# Patient Record
Sex: FEMALE | Race: WHITE | NOT HISPANIC OR LATINO | URBAN - METROPOLITAN AREA
[De-identification: names, ages, dates, MRNs, and addresses within clinical notes are randomized per-mention and may not be internally consistent; named-entity substitution may affect disease eponyms.]

---

## 2017-07-12 ENCOUNTER — OFFICE (AMBULATORY)
Dept: URBAN - METROPOLITAN AREA CLINIC 64 | Facility: CLINIC | Age: 47
End: 2017-07-12

## 2017-07-12 ENCOUNTER — HOSPITAL ENCOUNTER (OUTPATIENT)
Dept: OTHER | Facility: HOSPITAL | Age: 47
Setting detail: SPECIMEN
Discharge: HOME | End: 2017-07-12
Attending: INTERNAL MEDICINE | Admitting: INTERNAL MEDICINE

## 2017-07-12 ENCOUNTER — ON CAMPUS - OUTPATIENT (AMBULATORY)
Dept: URBAN - METROPOLITAN AREA HOSPITAL 2 | Facility: HOSPITAL | Age: 47
End: 2017-07-12

## 2017-07-12 VITALS
DIASTOLIC BLOOD PRESSURE: 63 MMHG | OXYGEN SATURATION: 99 % | HEART RATE: 114 BPM | HEART RATE: 79 BPM | SYSTOLIC BLOOD PRESSURE: 135 MMHG | SYSTOLIC BLOOD PRESSURE: 103 MMHG | RESPIRATION RATE: 15 BRPM | HEART RATE: 85 BPM | SYSTOLIC BLOOD PRESSURE: 114 MMHG | DIASTOLIC BLOOD PRESSURE: 93 MMHG | HEART RATE: 82 BPM | DIASTOLIC BLOOD PRESSURE: 65 MMHG | SYSTOLIC BLOOD PRESSURE: 146 MMHG | DIASTOLIC BLOOD PRESSURE: 81 MMHG | SYSTOLIC BLOOD PRESSURE: 116 MMHG | HEIGHT: 65 IN | DIASTOLIC BLOOD PRESSURE: 75 MMHG | RESPIRATION RATE: 20 BRPM | RESPIRATION RATE: 17 BRPM | OXYGEN SATURATION: 92 % | DIASTOLIC BLOOD PRESSURE: 83 MMHG | WEIGHT: 182 LBS | HEART RATE: 78 BPM | DIASTOLIC BLOOD PRESSURE: 69 MMHG | OXYGEN SATURATION: 94 % | RESPIRATION RATE: 18 BRPM | DIASTOLIC BLOOD PRESSURE: 71 MMHG | RESPIRATION RATE: 16 BRPM | OXYGEN SATURATION: 93 % | HEART RATE: 94 BPM | TEMPERATURE: 98.1 F | HEART RATE: 72 BPM | HEART RATE: 92 BPM | OXYGEN SATURATION: 97 % | OXYGEN SATURATION: 91 % | SYSTOLIC BLOOD PRESSURE: 117 MMHG | OXYGEN SATURATION: 89 % | SYSTOLIC BLOOD PRESSURE: 131 MMHG | SYSTOLIC BLOOD PRESSURE: 106 MMHG | SYSTOLIC BLOOD PRESSURE: 121 MMHG | OXYGEN SATURATION: 96 % | DIASTOLIC BLOOD PRESSURE: 48 MMHG

## 2017-07-12 DIAGNOSIS — D12.2 BENIGN NEOPLASM OF ASCENDING COLON: ICD-10-CM

## 2017-07-12 DIAGNOSIS — K29.80 DUODENITIS WITHOUT BLEEDING: ICD-10-CM

## 2017-07-12 DIAGNOSIS — D12.3 BENIGN NEOPLASM OF TRANSVERSE COLON: ICD-10-CM

## 2017-07-12 DIAGNOSIS — Z86.010 PERSONAL HISTORY OF COLONIC POLYPS: ICD-10-CM

## 2017-07-12 DIAGNOSIS — K64.1 SECOND DEGREE HEMORRHOIDS: ICD-10-CM

## 2017-07-12 DIAGNOSIS — K57.30 DIVERTICULOSIS OF LARGE INTESTINE WITHOUT PERFORATION OR ABS: ICD-10-CM

## 2017-07-12 DIAGNOSIS — K21.9 GASTRO-ESOPHAGEAL REFLUX DISEASE WITHOUT ESOPHAGITIS: ICD-10-CM

## 2017-07-12 LAB
GI HISTOLOGY: A. UNSPECIFIED: (no result)
GI HISTOLOGY: B. UNSPECIFIED: (no result)
GI HISTOLOGY: PDF REPORT: (no result)

## 2017-07-12 PROCEDURE — 45385 COLONOSCOPY W/LESION REMOVAL: CPT | Mod: PT | Performed by: INTERNAL MEDICINE

## 2017-07-12 PROCEDURE — 88305 TISSUE EXAM BY PATHOLOGIST: CPT | Mod: 26 | Performed by: INTERNAL MEDICINE

## 2017-07-12 PROCEDURE — 43235 EGD DIAGNOSTIC BRUSH WASH: CPT | Performed by: INTERNAL MEDICINE

## 2017-07-12 RX ORDER — LINACLOTIDE 290 UG/1
CAPSULE, GELATIN COATED ORAL
Qty: 90 | Refills: 0 | Status: COMPLETED
End: 2019-06-06

## 2017-07-12 RX ADMIN — PROPOFOL: 10 INJECTION, EMULSION INTRAVENOUS at 08:36

## 2018-01-03 ENCOUNTER — OFFICE (AMBULATORY)
Dept: URBAN - METROPOLITAN AREA CLINIC 64 | Facility: CLINIC | Age: 48
End: 2018-01-03

## 2018-01-03 VITALS
HEIGHT: 65 IN | DIASTOLIC BLOOD PRESSURE: 87 MMHG | SYSTOLIC BLOOD PRESSURE: 137 MMHG | WEIGHT: 185 LBS | HEART RATE: 63 BPM

## 2018-01-03 DIAGNOSIS — R11.0 NAUSEA: ICD-10-CM

## 2018-01-03 DIAGNOSIS — K62.5 HEMORRHAGE OF ANUS AND RECTUM: ICD-10-CM

## 2018-01-03 DIAGNOSIS — R10.84 GENERALIZED ABDOMINAL PAIN: ICD-10-CM

## 2018-01-03 DIAGNOSIS — K64.4 RESIDUAL HEMORRHOIDAL SKIN TAGS: ICD-10-CM

## 2018-01-03 LAB
CBC WITH DIFFERENTIAL/PLATELET: BASO (ABSOLUTE): 0 X10E3/UL (ref 0–0.2)
CBC WITH DIFFERENTIAL/PLATELET: BASOS: 0 %
CBC WITH DIFFERENTIAL/PLATELET: EOS (ABSOLUTE): 0.2 X10E3/UL (ref 0–0.4)
CBC WITH DIFFERENTIAL/PLATELET: EOS: 2 %
CBC WITH DIFFERENTIAL/PLATELET: HEMATOCRIT: 41.3 % (ref 34–46.6)
CBC WITH DIFFERENTIAL/PLATELET: HEMATOLOGY COMMENTS: (no result)
CBC WITH DIFFERENTIAL/PLATELET: HEMOGLOBIN: 14.2 G/DL (ref 11.1–15.9)
CBC WITH DIFFERENTIAL/PLATELET: IMMATURE CELLS: (no result)
CBC WITH DIFFERENTIAL/PLATELET: IMMATURE GRANS (ABS): 0 X10E3/UL (ref 0–0.1)
CBC WITH DIFFERENTIAL/PLATELET: IMMATURE GRANULOCYTES: 0 %
CBC WITH DIFFERENTIAL/PLATELET: LYMPHS (ABSOLUTE): 2.6 X10E3/UL (ref 0.7–3.1)
CBC WITH DIFFERENTIAL/PLATELET: LYMPHS: 29 %
CBC WITH DIFFERENTIAL/PLATELET: MCH: 31.8 PG (ref 26.6–33)
CBC WITH DIFFERENTIAL/PLATELET: MCHC: 34.4 G/DL (ref 31.5–35.7)
CBC WITH DIFFERENTIAL/PLATELET: MCV: 93 FL (ref 79–97)
CBC WITH DIFFERENTIAL/PLATELET: MONOCYTES(ABSOLUTE): 0.6 X10E3/UL (ref 0.1–0.9)
CBC WITH DIFFERENTIAL/PLATELET: MONOCYTES: 7 %
CBC WITH DIFFERENTIAL/PLATELET: NEUTROPHILS (ABSOLUTE): 5.3 X10E3/UL (ref 1.4–7)
CBC WITH DIFFERENTIAL/PLATELET: NEUTROPHILS: 62 %
CBC WITH DIFFERENTIAL/PLATELET: NRBC: (no result)
CBC WITH DIFFERENTIAL/PLATELET: PLATELETS: 235 X10E3/UL (ref 150–379)
CBC WITH DIFFERENTIAL/PLATELET: RBC: 4.46 X10E6/UL (ref 3.77–5.28)
CBC WITH DIFFERENTIAL/PLATELET: RDW: 13.5 % (ref 12.3–15.4)
CBC WITH DIFFERENTIAL/PLATELET: WBC: 8.7 X10E3/UL (ref 3.4–10.8)

## 2018-01-03 PROCEDURE — 99213 OFFICE O/P EST LOW 20 MIN: CPT | Performed by: NURSE PRACTITIONER

## 2018-01-03 RX ORDER — IBUPROFEN 800 MG/1
4800 TABLET ORAL
Refills: 0 | Status: COMPLETED
End: 2018-01-03

## 2018-01-03 RX ORDER — NAPROXEN SODIUM 500 MG/1
TABLET, FILM COATED, EXTENDED RELEASE ORAL
Qty: 0 | Refills: 0 | Status: COMPLETED
End: 2018-01-03

## 2018-01-03 RX ORDER — SACCHAROMYCES BOULARDII 50 MG
500 CAPSULE ORAL
Qty: 60 | Refills: 11 | Status: COMPLETED
Start: 2018-01-03 | End: 2018-07-13

## 2018-07-13 ENCOUNTER — OFFICE (AMBULATORY)
Dept: URBAN - METROPOLITAN AREA CLINIC 64 | Facility: CLINIC | Age: 48
End: 2018-07-13

## 2018-07-13 VITALS
HEIGHT: 65 IN | HEART RATE: 73 BPM | SYSTOLIC BLOOD PRESSURE: 128 MMHG | WEIGHT: 182 LBS | DIASTOLIC BLOOD PRESSURE: 98 MMHG

## 2018-07-13 DIAGNOSIS — K58.9 IRRITABLE BOWEL SYNDROME WITHOUT DIARRHEA: ICD-10-CM

## 2018-07-13 DIAGNOSIS — K64.1 SECOND DEGREE HEMORRHOIDS: ICD-10-CM

## 2018-07-13 PROCEDURE — 99214 OFFICE O/P EST MOD 30 MIN: CPT | Performed by: INTERNAL MEDICINE

## 2018-07-13 RX ORDER — LACTOBACIL 2/BIFIDO 1/S.THERMO 450B CELL
225 PACKET (EA) ORAL
Qty: 60 | Refills: 3 | Status: COMPLETED
Start: 2018-07-13 | End: 2019-06-06

## 2018-07-13 RX ORDER — DICYCLOMINE HYDROCHLORIDE 20 MG/1
60 TABLET ORAL
Qty: 120 | Refills: 12 | Status: COMPLETED
Start: 2018-07-13 | End: 2019-06-06 | Stop reason: SDUPTHER

## 2019-06-06 ENCOUNTER — ON CAMPUS - OUTPATIENT (AMBULATORY)
Dept: URBAN - METROPOLITAN AREA HOSPITAL 2 | Facility: HOSPITAL | Age: 49
End: 2019-06-06

## 2019-06-06 ENCOUNTER — OFFICE (AMBULATORY)
Dept: URBAN - METROPOLITAN AREA PATHOLOGY 4 | Facility: PATHOLOGY | Age: 49
End: 2019-06-06

## 2019-06-06 ENCOUNTER — HOSPITAL ENCOUNTER (OUTPATIENT)
Dept: OTHER | Facility: HOSPITAL | Age: 49
Setting detail: SPECIMEN
Discharge: HOME | End: 2019-06-06
Attending: INTERNAL MEDICINE | Admitting: INTERNAL MEDICINE

## 2019-06-06 VITALS
HEART RATE: 97 BPM | SYSTOLIC BLOOD PRESSURE: 161 MMHG | OXYGEN SATURATION: 95 % | HEIGHT: 65 IN | SYSTOLIC BLOOD PRESSURE: 125 MMHG | RESPIRATION RATE: 18 BRPM | DIASTOLIC BLOOD PRESSURE: 87 MMHG | SYSTOLIC BLOOD PRESSURE: 117 MMHG | OXYGEN SATURATION: 93 % | OXYGEN SATURATION: 97 % | HEART RATE: 95 BPM | OXYGEN SATURATION: 94 % | DIASTOLIC BLOOD PRESSURE: 75 MMHG | OXYGEN SATURATION: 96 % | HEART RATE: 96 BPM | SYSTOLIC BLOOD PRESSURE: 133 MMHG | SYSTOLIC BLOOD PRESSURE: 128 MMHG | DIASTOLIC BLOOD PRESSURE: 86 MMHG | HEART RATE: 107 BPM | SYSTOLIC BLOOD PRESSURE: 123 MMHG | SYSTOLIC BLOOD PRESSURE: 129 MMHG | HEART RATE: 87 BPM | RESPIRATION RATE: 16 BRPM | TEMPERATURE: 97.8 F | RESPIRATION RATE: 19 BRPM | OXYGEN SATURATION: 90 % | DIASTOLIC BLOOD PRESSURE: 70 MMHG | DIASTOLIC BLOOD PRESSURE: 96 MMHG | DIASTOLIC BLOOD PRESSURE: 72 MMHG | DIASTOLIC BLOOD PRESSURE: 78 MMHG | HEART RATE: 94 BPM | RESPIRATION RATE: 15 BRPM | SYSTOLIC BLOOD PRESSURE: 146 MMHG | HEART RATE: 90 BPM | WEIGHT: 181 LBS

## 2019-06-06 DIAGNOSIS — R19.4 CHANGE IN BOWEL HABIT: ICD-10-CM

## 2019-06-06 DIAGNOSIS — K62.5 HEMORRHAGE OF ANUS AND RECTUM: ICD-10-CM

## 2019-06-06 DIAGNOSIS — Z86.010 PERSONAL HISTORY OF COLONIC POLYPS: ICD-10-CM

## 2019-06-06 DIAGNOSIS — K57.30 DIVERTICULOSIS OF LARGE INTESTINE WITHOUT PERFORATION OR ABS: ICD-10-CM

## 2019-06-06 DIAGNOSIS — K64.1 SECOND DEGREE HEMORRHOIDS: ICD-10-CM

## 2019-06-06 LAB
GI HISTOLOGY: A. UNSPECIFIED: (no result)
GI HISTOLOGY: PDF REPORT: (no result)

## 2019-06-06 PROCEDURE — 88305 TISSUE EXAM BY PATHOLOGIST: CPT | Mod: 26 | Performed by: INTERNAL MEDICINE

## 2019-06-06 PROCEDURE — 45380 COLONOSCOPY AND BIOPSY: CPT | Performed by: INTERNAL MEDICINE

## 2019-06-06 RX ORDER — LINACLOTIDE 290 UG/1
CAPSULE, GELATIN COATED ORAL
Qty: 90 | Refills: 0 | Status: COMPLETED
End: 2019-06-06

## 2019-07-30 ENCOUNTER — OFFICE (AMBULATORY)
Dept: URBAN - METROPOLITAN AREA CLINIC 64 | Facility: CLINIC | Age: 49
End: 2019-07-30

## 2019-07-30 VITALS
DIASTOLIC BLOOD PRESSURE: 80 MMHG | WEIGHT: 185 LBS | HEIGHT: 65 IN | HEART RATE: 82 BPM | SYSTOLIC BLOOD PRESSURE: 117 MMHG

## 2019-07-30 DIAGNOSIS — R19.7 DIARRHEA, UNSPECIFIED: ICD-10-CM

## 2019-07-30 DIAGNOSIS — R11.0 NAUSEA: ICD-10-CM

## 2019-07-30 DIAGNOSIS — K58.9 IRRITABLE BOWEL SYNDROME WITHOUT DIARRHEA: ICD-10-CM

## 2019-07-30 PROCEDURE — 99213 OFFICE O/P EST LOW 20 MIN: CPT | Performed by: NURSE PRACTITIONER

## 2019-07-30 RX ORDER — RIFAXIMIN 550 MG/1
TABLET ORAL
Qty: 42 | Refills: 2 | Status: COMPLETED
End: 2023-02-01

## 2019-11-21 ENCOUNTER — HOSPITAL ENCOUNTER (EMERGENCY)
Facility: HOSPITAL | Age: 49
Discharge: HOME | End: 2019-11-21
Attending: EMERGENCY MEDICINE | Admitting: EMERGENCY MEDICINE

## 2019-11-21 VITALS
SYSTOLIC BLOOD PRESSURE: 125 MMHG | WEIGHT: 170.6 LBS | HEART RATE: 88 BPM | TEMPERATURE: 98.2 F | RESPIRATION RATE: 15 BRPM | HEIGHT: 64 IN | OXYGEN SATURATION: 98 % | DIASTOLIC BLOOD PRESSURE: 72 MMHG | BODY MASS INDEX: 29.12 KG/M2

## 2019-11-21 DIAGNOSIS — R10.13 EPIGASTRIC PAIN: Primary | ICD-10-CM

## 2019-11-21 DIAGNOSIS — K29.70 GASTRITIS WITHOUT BLEEDING, UNSPECIFIED CHRONICITY, UNSPECIFIED GASTRITIS TYPE: ICD-10-CM

## 2019-11-21 LAB
ALBUMIN SERPL-MCNC: 4.5 G/DL (ref 3.5–5.2)
ALBUMIN/GLOB SERPL: 1.4 G/DL
ALP SERPL-CCNC: 68 U/L (ref 39–117)
ALT SERPL W P-5'-P-CCNC: 18 U/L (ref 1–33)
ANION GAP SERPL CALCULATED.3IONS-SCNC: 13 MMOL/L (ref 5–15)
AST SERPL-CCNC: 18 U/L (ref 1–32)
BASOPHILS # BLD AUTO: 0 10*3/MM3 (ref 0–0.2)
BASOPHILS NFR BLD AUTO: 0.5 % (ref 0–1.5)
BILIRUB SERPL-MCNC: 0.7 MG/DL (ref 0.2–1.2)
BILIRUB UR QL STRIP: ABNORMAL
BUN BLD-MCNC: 11 MG/DL (ref 6–20)
BUN/CREAT SERPL: 15.5 (ref 7–25)
CALCIUM SPEC-SCNC: 9.8 MG/DL (ref 8.6–10.5)
CHLORIDE SERPL-SCNC: 105 MMOL/L (ref 98–107)
CLARITY UR: CLEAR
CO2 SERPL-SCNC: 22 MMOL/L (ref 22–29)
COLOR UR: ABNORMAL
CREAT BLD-MCNC: 0.71 MG/DL (ref 0.57–1)
DEPRECATED RDW RBC AUTO: 43.8 FL (ref 37–54)
EOSINOPHIL # BLD AUTO: 0.1 10*3/MM3 (ref 0–0.4)
EOSINOPHIL NFR BLD AUTO: 0.7 % (ref 0.3–6.2)
ERYTHROCYTE [DISTWIDTH] IN BLOOD BY AUTOMATED COUNT: 13.2 % (ref 12.3–15.4)
GFR SERPL CREATININE-BSD FRML MDRD: 87 ML/MIN/1.73
GLOBULIN UR ELPH-MCNC: 3.3 GM/DL
GLUCOSE BLD-MCNC: 111 MG/DL (ref 65–99)
GLUCOSE UR STRIP-MCNC: NEGATIVE MG/DL
HCT VFR BLD AUTO: 49.2 % (ref 34–46.6)
HGB BLD-MCNC: 16.7 G/DL (ref 12–15.9)
HGB UR QL STRIP.AUTO: NEGATIVE
KETONES UR QL STRIP: ABNORMAL
LEUKOCYTE ESTERASE UR QL STRIP.AUTO: NEGATIVE
LIPASE SERPL-CCNC: 31 U/L (ref 13–60)
LYMPHOCYTES # BLD AUTO: 1.8 10*3/MM3 (ref 0.7–3.1)
LYMPHOCYTES NFR BLD AUTO: 22.3 % (ref 19.6–45.3)
MCH RBC QN AUTO: 32 PG (ref 26.6–33)
MCHC RBC AUTO-ENTMCNC: 33.9 G/DL (ref 31.5–35.7)
MCV RBC AUTO: 94.3 FL (ref 79–97)
MONOCYTES # BLD AUTO: 0.5 10*3/MM3 (ref 0.1–0.9)
MONOCYTES NFR BLD AUTO: 5.9 % (ref 5–12)
NEUTROPHILS # BLD AUTO: 5.7 10*3/MM3 (ref 1.7–7)
NEUTROPHILS NFR BLD AUTO: 70.6 % (ref 42.7–76)
NITRITE UR QL STRIP: NEGATIVE
NRBC BLD AUTO-RTO: 0 /100 WBC (ref 0–0.2)
PH UR STRIP.AUTO: 6.5 [PH] (ref 5–8)
PLATELET # BLD AUTO: 207 10*3/MM3 (ref 140–450)
PMV BLD AUTO: 9.5 FL (ref 6–12)
POTASSIUM BLD-SCNC: 3.9 MMOL/L (ref 3.5–5.2)
PROT SERPL-MCNC: 7.8 G/DL (ref 6–8.5)
PROT UR QL STRIP: ABNORMAL
RBC # BLD AUTO: 5.21 10*6/MM3 (ref 3.77–5.28)
SODIUM BLD-SCNC: 140 MMOL/L (ref 136–145)
SP GR UR STRIP: 1.05 (ref 1–1.03)
UROBILINOGEN UR QL STRIP: ABNORMAL
WBC NRBC COR # BLD: 8.1 10*3/MM3 (ref 3.4–10.8)

## 2019-11-21 PROCEDURE — 99283 EMERGENCY DEPT VISIT LOW MDM: CPT

## 2019-11-21 PROCEDURE — 80053 COMPREHEN METABOLIC PANEL: CPT | Performed by: EMERGENCY MEDICINE

## 2019-11-21 PROCEDURE — 81003 URINALYSIS AUTO W/O SCOPE: CPT | Performed by: EMERGENCY MEDICINE

## 2019-11-21 PROCEDURE — 83690 ASSAY OF LIPASE: CPT | Performed by: EMERGENCY MEDICINE

## 2019-11-21 PROCEDURE — 85025 COMPLETE CBC W/AUTO DIFF WBC: CPT | Performed by: EMERGENCY MEDICINE

## 2019-11-21 RX ORDER — ONDANSETRON 4 MG/1
4 TABLET, ORALLY DISINTEGRATING ORAL EVERY 8 HOURS PRN
COMMUNITY

## 2019-11-21 RX ORDER — GABAPENTIN 600 MG/1
600 TABLET ORAL 3 TIMES DAILY
COMMUNITY

## 2019-11-21 RX ORDER — FUROSEMIDE 20 MG/1
20 TABLET ORAL DAILY
COMMUNITY

## 2019-11-21 RX ORDER — AZELASTINE 1 MG/ML
2 SPRAY, METERED NASAL 2 TIMES DAILY
COMMUNITY

## 2019-11-21 RX ORDER — ALUMINA, MAGNESIA, AND SIMETHICONE 2400; 2400; 240 MG/30ML; MG/30ML; MG/30ML
15 SUSPENSION ORAL ONCE
Status: COMPLETED | OUTPATIENT
Start: 2019-11-21 | End: 2019-11-21

## 2019-11-21 RX ORDER — SODIUM CHLORIDE 0.9 % (FLUSH) 0.9 %
10 SYRINGE (ML) INJECTION AS NEEDED
Status: DISCONTINUED | OUTPATIENT
Start: 2019-11-21 | End: 2019-11-21 | Stop reason: HOSPADM

## 2019-11-21 RX ORDER — ZAFIRLUKAST 20 MG/1
20 TABLET, FILM COATED ORAL 2 TIMES DAILY
COMMUNITY

## 2019-11-21 RX ORDER — MONTELUKAST SODIUM 10 MG/1
10 TABLET ORAL NIGHTLY
COMMUNITY

## 2019-11-21 RX ORDER — PANTOPRAZOLE SODIUM 40 MG/1
40 TABLET, DELAYED RELEASE ORAL DAILY
Qty: 14 TABLET | Refills: 0 | Status: SHIPPED | OUTPATIENT
Start: 2019-11-21

## 2019-11-21 RX ORDER — POTASSIUM CHLORIDE 20 MEQ/1
10 TABLET, EXTENDED RELEASE ORAL 2 TIMES DAILY
COMMUNITY

## 2019-11-21 RX ORDER — LEVOCETIRIZINE DIHYDROCHLORIDE 5 MG/1
5 TABLET, FILM COATED ORAL EVERY EVENING
COMMUNITY

## 2019-11-21 RX ORDER — DICYCLOMINE HCL 20 MG
20 TABLET ORAL EVERY 6 HOURS
COMMUNITY
End: 2020-07-15

## 2019-11-21 RX ORDER — ALBUTEROL SULFATE 90 UG/1
2 AEROSOL, METERED RESPIRATORY (INHALATION) EVERY 4 HOURS PRN
COMMUNITY

## 2019-11-21 RX ORDER — DULOXETIN HYDROCHLORIDE 30 MG/1
30 CAPSULE, DELAYED RELEASE ORAL DAILY
COMMUNITY

## 2019-11-21 RX ORDER — DEXTROAMPHETAMINE SACCHARATE, AMPHETAMINE ASPARTATE, DEXTROAMPHETAMINE SULFATE AND AMPHETAMINE SULFATE 2.5; 2.5; 2.5; 2.5 MG/1; MG/1; MG/1; MG/1
10 TABLET ORAL DAILY PRN
COMMUNITY

## 2019-11-21 RX ORDER — SUMATRIPTAN 25 MG/1
25 TABLET, FILM COATED ORAL
COMMUNITY

## 2019-11-21 RX ORDER — LIDOCAINE HYDROCHLORIDE 20 MG/ML
15 SOLUTION OROPHARYNGEAL ONCE
Status: COMPLETED | OUTPATIENT
Start: 2019-11-21 | End: 2019-11-21

## 2019-11-21 RX ADMIN — LIDOCAINE HYDROCHLORIDE 15 ML: 20 SOLUTION ORAL; TOPICAL at 13:56

## 2019-11-21 RX ADMIN — ALUMINUM HYDROXIDE, MAGNESIUM HYDROXIDE, AND DIMETHICONE 15 ML: 400; 400; 40 SUSPENSION ORAL at 13:55

## 2019-11-21 NOTE — ED PROVIDER NOTES
Subjective   Patient is a 49-year-old female complaining of epigastric abdominal pain for the past several hours.  States pain is sharp and moderate in intensity without radiation.  Denies cough fever shortness of breath from diarrhea or other associated complaints.            Review of Systems  Negative for headache ears no cough fever chest pain shortness breath Bondar dysuria his weight loss or other complaint.  Past Medical History:   Diagnosis Date   • Asthma    • Colitis    • Fibromyalgia    • Interstitial cystitis    • Irritable bowel syndrome    • Migraine        Allergies   Allergen Reactions   • Pollen Extract Hives, Itching, Rash, Shortness Of Breath and Swelling   • Tolu Grass Pollen Allergen Itching, Rash and Shortness Of Breath   • Hydromorphone Hives     Pruritic rash   • Hyoscyamine Sulfate Itching and Nausea And Vomiting   • Oxycodone Itching     Itching rash   • Adhesive Tape Rash     Peels skin   • Beef Allergy Hives, Nausea Only, Rash and Swelling   • Celery Hives, Itching, Rash and Swelling   • Chicken Allergy Itching, Nausea Only, Rash and Photosensitivity   • Codeine Itching, Nausea Only and Rash   • Dust Mite Extract Hives, Itching, Rash and Swelling   • Food Hives, Itching, Rash and Swelling     HAS FOOD ALLERGIES   • Jacquie Hives, Itching, Rash and Swelling   • Hydrocodone Itching and Rash   • Morphine Hives, Itching, Nausea Only, Rash and Swelling   • Nuts Hives, Itching, Rash and Swelling   • Other Hives, Itching, Nausea Only, Rash and Swelling     Potatoes, greens beans, and tuna fish, celery   • Tuna Flavor Hives, Itching, Rash and Swelling   • Turkey Hives, Itching, Rash and Swelling   • Vancomycin Hives, Itching, Nausea And Vomiting, Rash and Swelling   • Vanilla Hives, Rash and Swelling       Past Surgical History:   Procedure Laterality Date   • CHOLECYSTECTOMY     • EYE SURGERY     • HEMORRHOIDECTOMY     • HYSTERECTOMY         History reviewed. No pertinent family  history.    Social History     Socioeconomic History   • Marital status: Single     Spouse name: Not on file   • Number of children: Not on file   • Years of education: Not on file   • Highest education level: Not on file   Tobacco Use   • Smoking status: Never Smoker   Substance and Sexual Activity   • Alcohol use: No     Frequency: Never   • Drug use: No           Objective   Physical Exam  HEENT exam shows TMs to be clear.  Oropharynx, spit sclerae nonicteric.  Neck has no adenopathy JVD [place] lungs are clear.  Heart has regular rate and rhythm without murmur gallop her chest is nontender.  Abdomen soft with mild to moderate epigastric tenderness.  Patient has normal bowel sounds without rebound or guard.  Back has no CVA tenderness.  Extremity exam is no cyanosis or edema.  Procedures           ED Course      Results for orders placed or performed during the hospital encounter of 11/21/19   Comprehensive Metabolic Panel   Result Value Ref Range    Glucose 111 (H) 65 - 99 mg/dL    BUN 11 6 - 20 mg/dL    Creatinine 0.71 0.57 - 1.00 mg/dL    Sodium 140 136 - 145 mmol/L    Potassium 3.9 3.5 - 5.2 mmol/L    Chloride 105 98 - 107 mmol/L    CO2 22.0 22.0 - 29.0 mmol/L    Calcium 9.8 8.6 - 10.5 mg/dL    Total Protein 7.8 6.0 - 8.5 g/dL    Albumin 4.50 3.50 - 5.20 g/dL    ALT (SGPT) 18 1 - 33 U/L    AST (SGOT) 18 1 - 32 U/L    Alkaline Phosphatase 68 39 - 117 U/L    Total Bilirubin 0.7 0.2 - 1.2 mg/dL    eGFR Non African Amer 87 >60 mL/min/1.73    Globulin 3.3 gm/dL    A/G Ratio 1.4 g/dL    BUN/Creatinine Ratio 15.5 7.0 - 25.0    Anion Gap 13.0 5.0 - 15.0 mmol/L   Urinalysis With Microscopic If Indicated (No Culture) - Urine, Clean Catch   Result Value Ref Range    Color, UA Dark Yellow (A) Yellow, Straw    Appearance, UA Clear Clear    pH, UA 6.5 5.0 - 8.0    Specific Gravity, UA 1.048 (H) 1.005 - 1.030    Glucose, UA Negative Negative    Ketones, UA 40 mg/dL (2+) (A) Negative    Bilirubin, UA Small (1+) (A)  Negative    Blood, UA Negative Negative    Protein, UA Trace (A) Negative    Leuk Esterase, UA Negative Negative    Nitrite, UA Negative Negative    Urobilinogen, UA 1.0 E.U./dL 0.2 - 1.0 E.U./dL   Lipase   Result Value Ref Range    Lipase 31 13 - 60 U/L   CBC Auto Differential   Result Value Ref Range    WBC 8.10 3.40 - 10.80 10*3/mm3    RBC 5.21 3.77 - 5.28 10*6/mm3    Hemoglobin 16.7 (H) 12.0 - 15.9 g/dL    Hematocrit 49.2 (H) 34.0 - 46.6 %    MCV 94.3 79.0 - 97.0 fL    MCH 32.0 26.6 - 33.0 pg    MCHC 33.9 31.5 - 35.7 g/dL    RDW 13.2 12.3 - 15.4 %    RDW-SD 43.8 37.0 - 54.0 fl    MPV 9.5 6.0 - 12.0 fL    Platelets 207 140 - 450 10*3/mm3    Neutrophil % 70.6 42.7 - 76.0 %    Lymphocyte % 22.3 19.6 - 45.3 %    Monocyte % 5.9 5.0 - 12.0 %    Eosinophil % 0.7 0.3 - 6.2 %    Basophil % 0.5 0.0 - 1.5 %    Neutrophils, Absolute 5.70 1.70 - 7.00 10*3/mm3    Lymphocytes, Absolute 1.80 0.70 - 3.10 10*3/mm3    Monocytes, Absolute 0.50 0.10 - 0.90 10*3/mm3    Eosinophils, Absolute 0.10 0.00 - 0.40 10*3/mm3    Basophils, Absolute 0.00 0.00 - 0.20 10*3/mm3    nRBC 0.0 0.0 - 0.2 /100 WBC                 MDM  Number of Diagnoses or Management Options  Diagnosis management comments: Patient has a benign physical exam.  She did get relief of her pain with a GI cocktail.  There is no evidence of infectious process hepatitis pancreatitis or other abnormality.  Metabolic panel was normal.  Patient will be discharged and will be placed on Protonix.  She will follow with her gastroenterologist for further evaluation.    Risk of Complications, Morbidity, and/or Mortality  Presenting problems: high  Diagnostic procedures: high  Management options: high    Patient Progress  Patient progress: stable      Final diagnoses:   Epigastric pain   Gastritis without bleeding, unspecified chronicity, unspecified gastritis type              Boaz Read MD  11/21/19 0598

## 2019-11-21 NOTE — ED NOTES
Pt presents with abd pain with n/v/d x 2 days. Hx of IBS and colitis. Reports pain is getting worse.      Joey Mahoney RN  11/21/19 8350

## 2020-05-26 ENCOUNTER — LAB (OUTPATIENT)
Dept: LAB | Facility: HOSPITAL | Age: 50
End: 2020-05-26

## 2020-05-26 ENCOUNTER — TRANSCRIBE ORDERS (OUTPATIENT)
Dept: ADMINISTRATIVE | Facility: HOSPITAL | Age: 50
End: 2020-05-26

## 2020-05-26 DIAGNOSIS — J02.9 ACUTE PHARYNGITIS, UNSPECIFIED ETIOLOGY: ICD-10-CM

## 2020-05-26 DIAGNOSIS — J02.9 ACUTE PHARYNGITIS, UNSPECIFIED ETIOLOGY: Primary | ICD-10-CM

## 2020-05-26 LAB
BASOPHILS # BLD AUTO: 0.04 10*3/MM3 (ref 0–0.2)
BASOPHILS NFR BLD AUTO: 0.3 % (ref 0–1.5)
DEPRECATED RDW RBC AUTO: 42.3 FL (ref 37–54)
EOSINOPHIL # BLD AUTO: 0.2 10*3/MM3 (ref 0–0.4)
EOSINOPHIL NFR BLD AUTO: 1.6 % (ref 0.3–6.2)
ERYTHROCYTE [DISTWIDTH] IN BLOOD BY AUTOMATED COUNT: 12.2 % (ref 12.3–15.4)
HCT VFR BLD AUTO: 44.1 % (ref 34–46.6)
HGB BLD-MCNC: 15.1 G/DL (ref 12–15.9)
IMM GRANULOCYTES # BLD AUTO: 0.04 10*3/MM3 (ref 0–0.05)
IMM GRANULOCYTES NFR BLD AUTO: 0.3 % (ref 0–0.5)
LYMPHOCYTES # BLD AUTO: 3.02 10*3/MM3 (ref 0.7–3.1)
LYMPHOCYTES NFR BLD AUTO: 24.4 % (ref 19.6–45.3)
MCH RBC QN AUTO: 32.2 PG (ref 26.6–33)
MCHC RBC AUTO-ENTMCNC: 34.2 G/DL (ref 31.5–35.7)
MCV RBC AUTO: 94 FL (ref 79–97)
MONOCYTES # BLD AUTO: 0.88 10*3/MM3 (ref 0.1–0.9)
MONOCYTES NFR BLD AUTO: 7.1 % (ref 5–12)
NEUTROPHILS # BLD AUTO: 8.21 10*3/MM3 (ref 1.7–7)
NEUTROPHILS NFR BLD AUTO: 66.3 % (ref 42.7–76)
NRBC BLD AUTO-RTO: 0 /100 WBC (ref 0–0.2)
PLATELET # BLD AUTO: 227 10*3/MM3 (ref 140–450)
PMV BLD AUTO: 11.9 FL (ref 6–12)
RBC # BLD AUTO: 4.69 10*6/MM3 (ref 3.77–5.28)
WBC NRBC COR # BLD: 12.39 10*3/MM3 (ref 3.4–10.8)

## 2020-05-26 PROCEDURE — 85025 COMPLETE CBC W/AUTO DIFF WBC: CPT

## 2020-05-26 PROCEDURE — 36415 COLL VENOUS BLD VENIPUNCTURE: CPT

## 2020-07-15 ENCOUNTER — HOSPITAL ENCOUNTER (EMERGENCY)
Facility: HOSPITAL | Age: 50
Discharge: HOME | End: 2020-07-15
Attending: EMERGENCY MEDICINE | Admitting: EMERGENCY MEDICINE

## 2020-07-15 ENCOUNTER — APPOINTMENT (OUTPATIENT)
Dept: GENERAL RADIOLOGY | Facility: HOSPITAL | Age: 50
End: 2020-07-15

## 2020-07-15 VITALS
BODY MASS INDEX: 29.13 KG/M2 | OXYGEN SATURATION: 96 % | RESPIRATION RATE: 26 BRPM | SYSTOLIC BLOOD PRESSURE: 123 MMHG | TEMPERATURE: 99.3 F | DIASTOLIC BLOOD PRESSURE: 64 MMHG | WEIGHT: 170.64 LBS | HEIGHT: 64 IN | HEART RATE: 88 BPM

## 2020-07-15 DIAGNOSIS — U07.1 COVID-19 VIRUS INFECTION: Primary | ICD-10-CM

## 2020-07-15 DIAGNOSIS — R11.2 NAUSEA AND VOMITING, INTRACTABILITY OF VOMITING NOT SPECIFIED, UNSPECIFIED VOMITING TYPE: ICD-10-CM

## 2020-07-15 LAB
ALBUMIN SERPL-MCNC: 4.5 G/DL (ref 3.5–5.2)
ALBUMIN/GLOB SERPL: 1.6 G/DL
ALP SERPL-CCNC: 64 U/L (ref 39–117)
ALT SERPL W P-5'-P-CCNC: 14 U/L (ref 1–33)
ANION GAP SERPL CALCULATED.3IONS-SCNC: 17 MMOL/L (ref 5–15)
AST SERPL-CCNC: 17 U/L (ref 1–32)
BASOPHILS # BLD AUTO: 0 10*3/MM3 (ref 0–0.2)
BASOPHILS NFR BLD AUTO: 0.6 % (ref 0–1.5)
BILIRUB SERPL-MCNC: 0.4 MG/DL (ref 0–1.2)
BUN SERPL-MCNC: 16 MG/DL (ref 6–20)
BUN SERPL-MCNC: ABNORMAL MG/DL
BUN/CREAT SERPL: ABNORMAL
CALCIUM SPEC-SCNC: 9.6 MG/DL (ref 8.6–10.5)
CHLORIDE SERPL-SCNC: 102 MMOL/L (ref 98–107)
CO2 SERPL-SCNC: 18 MMOL/L (ref 22–29)
CREAT SERPL-MCNC: 0.81 MG/DL (ref 0.57–1)
D-LACTATE SERPL-SCNC: 1.5 MMOL/L (ref 0.5–2)
DEPRECATED RDW RBC AUTO: 42.4 FL (ref 37–54)
EOSINOPHIL # BLD AUTO: 0 10*3/MM3 (ref 0–0.4)
EOSINOPHIL NFR BLD AUTO: 0.2 % (ref 0.3–6.2)
ERYTHROCYTE [DISTWIDTH] IN BLOOD BY AUTOMATED COUNT: 12.9 % (ref 12.3–15.4)
GFR SERPL CREATININE-BSD FRML MDRD: 75 ML/MIN/1.73
GLOBULIN UR ELPH-MCNC: 2.9 GM/DL
GLUCOSE SERPL-MCNC: 125 MG/DL (ref 65–99)
HCT VFR BLD AUTO: 48.1 % (ref 34–46.6)
HGB BLD-MCNC: 16.3 G/DL (ref 12–15.9)
HOLD SPECIMEN: NORMAL
LYMPHOCYTES # BLD AUTO: 0.6 10*3/MM3 (ref 0.7–3.1)
LYMPHOCYTES NFR BLD AUTO: 9.6 % (ref 19.6–45.3)
MCH RBC QN AUTO: 31.6 PG (ref 26.6–33)
MCHC RBC AUTO-ENTMCNC: 34 G/DL (ref 31.5–35.7)
MCV RBC AUTO: 93.1 FL (ref 79–97)
MONOCYTES # BLD AUTO: 0.8 10*3/MM3 (ref 0.1–0.9)
MONOCYTES NFR BLD AUTO: 12.7 % (ref 5–12)
NEUTROPHILS NFR BLD AUTO: 5 10*3/MM3 (ref 1.7–7)
NEUTROPHILS NFR BLD AUTO: 76.9 % (ref 42.7–76)
NRBC BLD AUTO-RTO: 0 /100 WBC (ref 0–0.2)
PLATELET # BLD AUTO: 200 10*3/MM3 (ref 140–450)
PMV BLD AUTO: 8.8 FL (ref 6–12)
POTASSIUM SERPL-SCNC: 4.1 MMOL/L (ref 3.5–5.2)
PROT SERPL-MCNC: 7.4 G/DL (ref 6–8.5)
RBC # BLD AUTO: 5.17 10*6/MM3 (ref 3.77–5.28)
SARS-COV-2 RNA PNL SPEC NAA+PROBE: DETECTED
SODIUM SERPL-SCNC: 137 MMOL/L (ref 136–145)
WBC # BLD AUTO: 6.5 10*3/MM3 (ref 3.4–10.8)
WHOLE BLOOD HOLD SPECIMEN: NORMAL
WHOLE BLOOD HOLD SPECIMEN: NORMAL

## 2020-07-15 PROCEDURE — 85025 COMPLETE CBC W/AUTO DIFF WBC: CPT | Performed by: EMERGENCY MEDICINE

## 2020-07-15 PROCEDURE — 87040 BLOOD CULTURE FOR BACTERIA: CPT | Performed by: EMERGENCY MEDICINE

## 2020-07-15 PROCEDURE — 80053 COMPREHEN METABOLIC PANEL: CPT | Performed by: EMERGENCY MEDICINE

## 2020-07-15 PROCEDURE — 71045 X-RAY EXAM CHEST 1 VIEW: CPT

## 2020-07-15 PROCEDURE — 99284 EMERGENCY DEPT VISIT MOD MDM: CPT

## 2020-07-15 PROCEDURE — 87635 SARS-COV-2 COVID-19 AMP PRB: CPT | Performed by: EMERGENCY MEDICINE

## 2020-07-15 PROCEDURE — 83605 ASSAY OF LACTIC ACID: CPT

## 2020-07-15 RX ORDER — SODIUM CHLORIDE 0.9 % (FLUSH) 0.9 %
10 SYRINGE (ML) INJECTION AS NEEDED
Status: DISCONTINUED | OUTPATIENT
Start: 2020-07-15 | End: 2020-07-15 | Stop reason: HOSPADM

## 2020-07-15 RX ORDER — ONDANSETRON 4 MG/1
4 TABLET, ORALLY DISINTEGRATING ORAL EVERY 8 HOURS PRN
Qty: 14 TABLET | Refills: 0 | Status: SHIPPED | OUTPATIENT
Start: 2020-07-15

## 2020-07-15 RX ORDER — OLOPATADINE HYDROCHLORIDE 1 MG/ML
1 SOLUTION/ DROPS OPHTHALMIC 2 TIMES DAILY
COMMUNITY

## 2020-07-15 RX ORDER — IBUPROFEN 400 MG/1
800 TABLET ORAL ONCE
Status: COMPLETED | OUTPATIENT
Start: 2020-07-15 | End: 2020-07-15

## 2020-07-15 RX ADMIN — IBUPROFEN 800 MG: 400 TABLET ORAL at 17:06

## 2020-07-15 NOTE — ED PROVIDER NOTES
Subjective   Patient is a 50-year-old female with vomiting diarrhea achiness and headache for the past 3 days.          Review of Systems  Negative for earache sore throat neck pain cough fever chest pain diarrhea weakness or weight loss.  Past Medical History:   Diagnosis Date   • Asthma    • Colitis    • Fibromyalgia    • Interstitial cystitis    • Irritable bowel syndrome    • Migraine        Allergies   Allergen Reactions   • Pollen Extract Hives, Itching, Rash, Shortness Of Breath and Swelling   • Tolu Grass Pollen Allergen Itching, Rash and Shortness Of Breath   • Hydromorphone Hives     Pruritic rash   • Hyoscyamine Sulfate Itching and Nausea And Vomiting   • Oxycodone Itching     Itching rash   • Adhesive Tape Rash     Peels skin   • Beef Allergy Hives, Nausea Only, Rash and Swelling   • Celery Hives, Itching, Rash and Swelling   • Chicken Allergy Itching, Nausea Only, Rash and Photosensitivity   • Codeine Itching, Nausea Only and Rash   • Dust Mite Extract Hives, Itching, Rash and Swelling   • Food Hives, Itching, Rash and Swelling     HAS FOOD ALLERGIES   • Jacquie Hives, Itching, Rash and Swelling   • Hydrocodone Itching and Rash   • Morphine Hives, Itching, Nausea Only, Rash and Swelling   • Nuts Hives, Itching, Rash and Swelling   • Other Hives, Itching, Nausea Only, Rash and Swelling     Potatoes, greens beans, and tuna fish, celery   • Tuna Flavor Hives, Itching, Rash and Swelling   • Turkey Hives, Itching, Rash and Swelling   • Vancomycin Hives, Itching, Nausea And Vomiting, Rash and Swelling   • Vanilla Hives, Rash and Swelling       Past Surgical History:   Procedure Laterality Date   • CHOLECYSTECTOMY     • EYE SURGERY     • HEMORRHOIDECTOMY     • HYSTERECTOMY         No family history on file.    Social History     Socioeconomic History   • Marital status:      Spouse name: Not on file   • Number of children: Not on file   • Years of education: Not on file   • Highest education level:  Not on file   Tobacco Use   • Smoking status: Never Smoker   Substance and Sexual Activity   • Alcohol use: No     Frequency: Never   • Drug use: No           Objective   Physical Exam  HEENT exam shows TMs to be clear.  Oropharynx comers but sclerae nonicteric.  Neck has no adenopathy JVD or bruits.  Lungs are clear.  Heart has a regular rate rhythm without murmur or gallop.  Chest is nontender.  Abdomen is soft nontender extremity exam is no cyanosis or edema.  Procedures           ED Course      Results for orders placed or performed during the hospital encounter of 07/15/20   COVID-19 [facility] IN-HOUSE, Nasal Swab No Transport Media - Swab, Nasal Cavity   Result Value Ref Range    COVID19 Detected (C) Not Detected - Ref. Range   Comprehensive Metabolic Panel   Result Value Ref Range    Glucose 125 (H) 65 - 99 mg/dL    BUN      Creatinine 0.81 0.57 - 1.00 mg/dL    Sodium 137 136 - 145 mmol/L    Potassium 4.1 3.5 - 5.2 mmol/L    Chloride 102 98 - 107 mmol/L    CO2 18.0 (L) 22.0 - 29.0 mmol/L    Calcium 9.6 8.6 - 10.5 mg/dL    Total Protein 7.4 6.0 - 8.5 g/dL    Albumin 4.50 3.50 - 5.20 g/dL    ALT (SGPT) 14 1 - 33 U/L    AST (SGOT) 17 1 - 32 U/L    Alkaline Phosphatase 64 39 - 117 U/L    Total Bilirubin 0.4 0.0 - 1.2 mg/dL    eGFR Non African Amer 75 >60 mL/min/1.73    Globulin 2.9 gm/dL    A/G Ratio 1.6 g/dL    BUN/Creatinine Ratio      Anion Gap 17.0 (H) 5.0 - 15.0 mmol/L   CBC Auto Differential   Result Value Ref Range    WBC 6.50 3.40 - 10.80 10*3/mm3    RBC 5.17 3.77 - 5.28 10*6/mm3    Hemoglobin 16.3 (H) 12.0 - 15.9 g/dL    Hematocrit 48.1 (H) 34.0 - 46.6 %    MCV 93.1 79.0 - 97.0 fL    MCH 31.6 26.6 - 33.0 pg    MCHC 34.0 31.5 - 35.7 g/dL    RDW 12.9 12.3 - 15.4 %    RDW-SD 42.4 37.0 - 54.0 fl    MPV 8.8 6.0 - 12.0 fL    Platelets 200 140 - 450 10*3/mm3    Neutrophil % 76.9 (H) 42.7 - 76.0 %    Lymphocyte % 9.6 (L) 19.6 - 45.3 %    Monocyte % 12.7 (H) 5.0 - 12.0 %    Eosinophil % 0.2 (L) 0.3 - 6.2 %     Basophil % 0.6 0.0 - 1.5 %    Neutrophils, Absolute 5.00 1.70 - 7.00 10*3/mm3    Lymphocytes, Absolute 0.60 (L) 0.70 - 3.10 10*3/mm3    Monocytes, Absolute 0.80 0.10 - 0.90 10*3/mm3    Eosinophils, Absolute 0.00 0.00 - 0.40 10*3/mm3    Basophils, Absolute 0.00 0.00 - 0.20 10*3/mm3    nRBC 0.0 0.0 - 0.2 /100 WBC   BUN   Result Value Ref Range    BUN 16 6 - 20 mg/dL   POC Lactate   Result Value Ref Range    Lactate 1.5 0.5 - 2.0 mmol/L   Light Blue Top   Result Value Ref Range    Extra Tube hold for add-on    Lavender Top   Result Value Ref Range    Extra Tube hold for add-on    Gold Top - SST   Result Value Ref Range    Extra Tube Hold for add-ons.      Xr Chest 1 View    Result Date: 7/15/2020  No acute cardiopulmonary abnormality.  Electronically Signed By-Guille Cantrell On:7/15/2020 5:46 PM This report was finalized on [id number] by  Guille Cantrell, .                                         MDM  Number of Diagnoses or Management Options  Diagnosis management comments: Patient is COVID positive.  There is no evidence of other infectious process or metabolic abnormality.  Patient is not tachypneic on reexamination.  Heart rate is 80.  O2 saturation 90% on room air.  She will be discharged.  She will self quarantine.  Use Tylenol or ibuprofen for fever control or achiness.  She will see MD for recheck and return as needed.    Risk of Complications, Morbidity, and/or Mortality  Presenting problems: high  Diagnostic procedures: high  Management options: high    Patient Progress  Patient progress: stable      Final diagnoses:   COVID-19 virus infection   Nausea and vomiting, intractability of vomiting not specified, unspecified vomiting type            Boaz Read MD  07/15/20 5641

## 2020-07-15 NOTE — DISCHARGE INSTRUCTIONS
Tylenol or ibuprofen for fever control.  Self quarantine.  See your MD for recheck as needed.  Return for increasing shortness of breath.

## 2020-07-15 NOTE — ED NOTES
Patient was around [employer] that have a therapist come to their house.  That therapist tested positive for COVID.  Patient feels like she has the flu.  She has body aches, chills, headache, (diarrhea and vomiting but none for past 24 hours).  Patient feels short of breath.     Daja Miller RN  07/15/20 0358

## 2020-07-16 ENCOUNTER — PATIENT OUTREACH (OUTPATIENT)
Dept: CASE MANAGEMENT | Facility: OTHER | Age: 50
End: 2020-07-16

## 2020-07-16 ENCOUNTER — EPISODE CHANGES (OUTPATIENT)
Dept: CASE MANAGEMENT | Facility: OTHER | Age: 50
End: 2020-07-16

## 2020-07-16 NOTE — OUTREACH NOTE
"ED Potential Covid Discharge Follow-up  Talked with patient. Discussed 7/15/20 ED visit regarding COVID 19 virus infection. Patient has received COVID 19 test results as positive.  Patient compliant with ED recommendations; will be obtaining Zofran today and take as directed and under quarantine.   Patient reports symptoms of: Fever yesterday was 102 and today no fever reported. Has episodes of chest pain and SOB (\"a little\"). Has decreased appetite. She is drinking fluids such as water, Gatorade and Pedialyte and tolerating without difficulty.  States to have had soup but not very hungry. No difficulty with  sleeping.  Patient reports no barriers in obtaining : food; medication and has transportation (assisted by family)   Patient lives with son; independent with ADL's; receiving assistance from family as needed. She states to be compliant with medications and monitoring O2 SAT.    Reviewed with patient: ED recommendations; quarantine education; education regarding being free of fever for 72 hours without use of Tylenol; hydration;  Education regarding O2 SAT; 24/7 Nurse Triage Line; COVID 19 information telephone line; ACM contact information;  My Chart; Telehealth appointment availability; PCP contact for follow up and recommendations and monitoring of symptoms (fever, O2 SAT, chest pain; SOB) and return to ED as needed.Patient verbalized understanding. She states to appreciate phone call. No further questions or concerns voiced at this time.     Leonora Maher RN  Ambulatory     7/16/2020, 13:36      "

## 2020-07-16 NOTE — OUTREACH NOTE
Care Coordination Note  Talked with Liliane/ PCP office [phone] regarding patient's 7/15/20 ED visit , lab results and follow up. She verbalized understanding and will give message to provider for follow up as needed.     Leonora Maher RN  Ambulatory     7/16/2020, 13:48

## 2020-07-19 ENCOUNTER — NURSE TRIAGE (OUTPATIENT)
Dept: CALL CENTER | Facility: HOSPITAL | Age: 50
End: 2020-07-19

## 2020-07-19 NOTE — TELEPHONE ENCOUNTER
Reason for Disposition  • [1] Fever returns after gone for over 24 hours AND [2] symptoms worse or not improved    Additional Information  • Negative: SEVERE difficulty breathing (e.g., struggling for each breath, speaks in single words)  • Negative: Difficult to awaken or acting confused (e.g., disoriented, slurred speech)  • Negative: Bluish (or gray) lips or face now  • Negative: Shock suspected (e.g., cold/pale/clammy skin, too weak to stand, low BP, rapid pulse)  • Negative: Sounds like a life-threatening emergency to the triager  • Negative: [1] COVID-19 exposure AND [2] no symptoms  • Negative: COVID-19 and Breastfeeding, questions about  • Negative: [1] Adult with possible COVID-19 symptoms AND [2] triager concerned about severity of symptoms or other causes  • Negative: SEVERE or constant chest pain or pressure (Exception: mild central chest pain, present only when coughing)  • Negative: MODERATE difficulty breathing (e.g., speaks in phrases, SOB even at rest, pulse 100-120)  • Negative: Patient sounds very sick or weak to the triager  • Negative: MILD difficulty breathing (e.g., minimal/no SOB at rest, SOB with walking, pulse <100)  • Negative: Chest pain or pressure  • Negative: Fever > 103 F (39.4 C)  • Negative: [1] Fever > 101 F (38.3 C) AND [2] age > 60  • Negative: [1] Fever > 100.0 F (37.8 C) AND [2] bedridden (e.g., nursing home patient, CVA, chronic illness, recovering from surgery)  • Negative: HIGH RISK patient (e.g., age > 64 years, diabetes, heart or lung disease, weak immune system)  • Negative: Fever present > 3 days (72 hours)  • Negative: [1] Continuous (nonstop) coughing interferes with work or school AND [2] no improvement using cough treatment per protocol  • Negative: [1] COVID-19 infection suspected by caller or triager AND [2] mild symptoms (cough, fever, or others) AND [3] no complications or SOB  • Negative: Cough present > 3 weeks  • Negative: [1] COVID-19 diagnosed by  "positive lab test AND [2] mild symptoms (e.g., cough, fever, others) AND [3] no complications or SOB  • Negative: [1] COVID-19 diagnosed by HCP (doctor, NP or PA) AND [2] mild symptoms (e.g., cough, fever, others) AND [3] no complications or SOB  • Negative: COVID-19 Home Isolation, questions about    Answer Assessment - Initial Assessment Questions  1. COVID-19 DIAGNOSIS: \"Who made your Coronavirus (COVID-19) diagnosis?\" \"Was it confirmed by a positive lab test?\" If not diagnosed by a HCP, ask \"Are there lots of cases (community spread) where you live?\" (See public health department website, if unsure)       Positive covid test on wedneday  2. ONSET: \"When did the COVID-19 symptoms start?\"        A week ago  3. WORST SYMPTOM: \"What is your worst symptom?\" (e.g., cough, fever, shortness of breath, muscle aches)     cough  4. COUGH: \"Do you have a cough?\" If so, ask: \"How bad is the cough?\"        yes  5. FEVER: \"Do you have a fever?\" If so, ask: \"What is your temperature, how was it measured, and when did it start?\"     100.5  6. RESPIRATORY STATUS: \"Describe your breathing?\" (e.g., shortness of breath, wheezing, unable to speak)        SOB  7. BETTER-SAME-WORSE: \"Are you getting better, staying the same or getting worse compared to yesterday?\"  If getting worse, ask, \"In what way?\"      Feels worse  8. HIGH RISK DISEASE: \"Do you have any chronic medical problems?\" (e.g., asthma, heart or lung disease, weak immune system, etc.)      asthma  9. PREGNANCY: \"Is there any chance you are pregnant?\" \"When was your last menstrual period?\"      *no10. OTHER SYMPTOMS: \"Do you have any other symptoms?\"  (e.g., chills, fatigue, headache, loss of smell or taste, muscle pain, sore throat)         Fatigue,achy    Protocols used: CORONAVIRUS (COVID-19) DIAGNOSED OR SUSPECTED-ADULT-AH      "

## 2020-07-20 LAB
BACTERIA SPEC AEROBE CULT: NORMAL
BACTERIA SPEC AEROBE CULT: NORMAL

## 2020-08-16 ENCOUNTER — APPOINTMENT (OUTPATIENT)
Dept: GENERAL RADIOLOGY | Facility: HOSPITAL | Age: 50
End: 2020-08-16

## 2020-08-16 ENCOUNTER — HOSPITAL ENCOUNTER (EMERGENCY)
Facility: HOSPITAL | Age: 50
Discharge: HOME | End: 2020-08-16
Admitting: EMERGENCY MEDICINE

## 2020-08-16 VITALS
HEIGHT: 64 IN | WEIGHT: 169.97 LBS | OXYGEN SATURATION: 99 % | TEMPERATURE: 98.2 F | BODY MASS INDEX: 29.02 KG/M2 | HEART RATE: 71 BPM | DIASTOLIC BLOOD PRESSURE: 78 MMHG | SYSTOLIC BLOOD PRESSURE: 115 MMHG | RESPIRATION RATE: 18 BRPM

## 2020-08-16 DIAGNOSIS — M25.531 RIGHT WRIST PAIN: Primary | ICD-10-CM

## 2020-08-16 DIAGNOSIS — S63.501A SPRAIN OF RIGHT WRIST, INITIAL ENCOUNTER: ICD-10-CM
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
## 2020-08-16 PROCEDURE — 96372 THER/PROPH/DIAG INJ SC/IM: CPT

## 2020-08-16 PROCEDURE — 73130 X-RAY EXAM OF HAND: CPT

## 2020-08-16 PROCEDURE — 73090 X-RAY EXAM OF FOREARM: CPT

## 2020-08-16 PROCEDURE — 25010000002 KETOROLAC TROMETHAMINE PER 15 MG: Performed by: NURSE PRACTITIONER

## 2020-08-16 PROCEDURE — 99283 EMERGENCY DEPT VISIT LOW MDM: CPT

## 2020-08-16 RX ORDER — DICLOFENAC SODIUM 75 MG/1
75 TABLET, DELAYED RELEASE ORAL 2 TIMES DAILY PRN
Qty: 20 TABLET | Refills: 0 | Status: SHIPPED | OUTPATIENT
Start: 2020-08-16

## 2020-08-16 RX ORDER — KETOROLAC TROMETHAMINE 30 MG/ML
30 INJECTION, SOLUTION INTRAMUSCULAR; INTRAVENOUS ONCE
Status: COMPLETED | OUTPATIENT
Start: 2020-08-16 | End: 2020-08-16

## 2020-08-16 RX ADMIN — KETOROLAC TROMETHAMINE 30 MG: 30 INJECTION, SOLUTION INTRAMUSCULAR at 19:15

## 2020-08-16 NOTE — ED NOTES
Pt reports she tripped and caught herself with her right arm against a wall and jammed a few of her fingers and is now having arm stiffness and wrist pain        Gabrielle Rivera RN  08/16/20 1829

## 2020-08-16 NOTE — ED PROVIDER NOTES
Subjective   Patient is a 50 year old female who fell last night at home on an outstretched hand and hurt her left wrist.  She states last night she felt like she just jammed her wrist but today the pain increased which brought her to the emergency room.  She denies any numbness or tingling.  She states that the pain is a 7/10.          Review of Systems   Constitutional: Negative for chills, fatigue and fever.   HENT: Negative for congestion, tinnitus and trouble swallowing.    Eyes: Negative for photophobia, discharge and redness.   Respiratory: Negative for cough and shortness of breath.    Cardiovascular: Negative for chest pain and palpitations.   Gastrointestinal: Negative for abdominal pain, diarrhea, nausea and vomiting.   Genitourinary: Negative for dysuria, frequency and urgency.   Musculoskeletal: Positive for joint swelling. Negative for back pain and myalgias.        Right wrist pain   Skin: Negative for rash.   Neurological: Negative for dizziness and headaches.   Psychiatric/Behavioral: Negative for confusion.   All other systems reviewed and are negative.      Past Medical History:   Diagnosis Date   • Asthma    • Colitis    • Fibromyalgia    • Interstitial cystitis    • Irritable bowel syndrome    • Migraine        Allergies   Allergen Reactions   • Pollen Extract Hives, Itching, Rash, Shortness Of Breath and Swelling   • Tolu Grass Pollen Allergen Itching, Rash and Shortness Of Breath   • Hydromorphone Hives     Pruritic rash   • Hyoscyamine Sulfate Itching and Nausea And Vomiting   • Oxycodone Itching     Itching rash   • Adhesive Tape Rash     Peels skin   • Beef Allergy Hives, Nausea Only, Rash and Swelling   • Celery Hives, Itching, Rash and Swelling   • Chicken Allergy Itching, Nausea Only, Rash and Photosensitivity   • Codeine Itching, Nausea Only and Rash   • Dust Mite Extract Hives, Itching, Rash and Swelling   • Food Hives, Itching, Rash and Swelling     HAS FOOD ALLERGIES   • Jacquie  Hives, Itching, Rash and Swelling   • Hydrocodone Itching and Rash   • Morphine Hives, Itching, Nausea Only, Rash and Swelling   • Nuts Hives, Itching, Rash and Swelling   • Other Hives, Itching, Nausea Only, Rash and Swelling     Potatoes, greens beans, and tuna fish, celery   • Tuna Flavor Hives, Itching, Rash and Swelling   • Turkey Hives, Itching, Rash and Swelling   • Vancomycin Hives, Itching, Nausea And Vomiting, Rash and Swelling   • Vanilla Hives, Rash and Swelling       Past Surgical History:   Procedure Laterality Date   • CHOLECYSTECTOMY     • EYE SURGERY     • HEMORRHOIDECTOMY     • HYSTERECTOMY         No family history on file.    Social History     Socioeconomic History   • Marital status:      Spouse name: Not on file   • Number of children: Not on file   • Years of education: Not on file   • Highest education level: Not on file   Tobacco Use   • Smoking status: Never Smoker   Substance and Sexual Activity   • Alcohol use: No     Frequency: Never   • Drug use: No           Objective   Physical Exam   Constitutional: She is oriented to person, place, and time. She appears well-developed and well-nourished.   HENT:   Head: Normocephalic and atraumatic.   Eyes: Pupils are equal, round, and reactive to light. Conjunctivae and EOM are normal.   Neck: Normal range of motion. Neck supple.   Cardiovascular: Normal rate, regular rhythm, normal heart sounds and intact distal pulses.   Pulmonary/Chest: Effort normal and breath sounds normal. No respiratory distress. She has no wheezes.   Abdominal: She exhibits no distension and no mass. There is no tenderness. There is no rebound and no guarding.   Musculoskeletal: She exhibits no deformity.        Right wrist: She exhibits decreased range of motion and tenderness. She exhibits no swelling, no effusion, no crepitus, no deformity and no laceration.   Left wrist is tender to palpation without obvious deformity.  The radius ulnar medial nerve are found  "to be intact.  There is pain with extension and flexion.  Good distal pulse good cap refill distally neurovascularly intact   Neurological: She is alert and oriented to person, place, and time. She displays normal reflexes. No cranial nerve deficit or sensory deficit. GCS eye subscore is 4. GCS verbal subscore is 5. GCS motor subscore is 6.   Skin: Skin is warm and dry. Capillary refill takes less than 2 seconds.   Psychiatric: She has a normal mood and affect. Her behavior is normal. Judgment and thought content normal.   Vitals reviewed.      Procedures           ED Course      /85 (BP Location: Left arm, Patient Position: Sitting)   Pulse 68   Temp 98.1 °F (36.7 °C) (Oral)   Resp 18   Ht 162.6 cm (64\")   Wt 77.1 kg (169 lb 15.6 oz)   SpO2 98%   BMI 29.18 kg/m²   Labs Reviewed - No data to display  Medications   ketorolac (TORADOL) injection 30 mg (30 mg Intramuscular Given 8/16/20 1915)     Xr Forearm 2 View Right    Result Date: 8/16/2020   1.  No acute bony abnormality of the right forearm.  Electronically Signed ByEmily Rivas On:8/16/2020 7:07 PM This report was finalized on [id number] by  Tad Rivas, .    Xr Hand 3+ View Right    Result Date: 8/16/2020   1.  No acute bony abnormality of the right hand.  Electronically Signed ByEmily Rivas On:8/16/2020 7:09 PM This report was finalized on [id number] by  Tad Rivas, .                                         MDM  Number of Diagnoses or Management Options  Diagnosis management comments: Patient had above exam and x-ray was obtained and reviewed of the forearm and the wrist and found to be within normal limits.  The patient was placed in a long-arm leatherette splint and was distally neurovascular intact after placement.  Patient was given a Toradol shot here in the emergency room she will be sent home with some diclofenac and told to follow-up with the orthopedic doctor for further evaluation and treatment if still painful in 10 " days.  She verbalized understood discharge instructions      Final diagnoses:   Right wrist pain   Fall, initial encounter   Sprain of right wrist, initial encounter            Rosa M Shell, APRN  08/16/20 1938

## 2021-04-02 ENCOUNTER — APPOINTMENT (OUTPATIENT)
Dept: GENERAL RADIOLOGY | Facility: HOSPITAL | Age: 51
End: 2021-04-02

## 2021-04-02 ENCOUNTER — HOSPITAL ENCOUNTER (EMERGENCY)
Facility: HOSPITAL | Age: 51
Discharge: HOME | End: 2021-04-02
Admitting: EMERGENCY MEDICINE

## 2021-04-02 ENCOUNTER — APPOINTMENT (OUTPATIENT)
Dept: CARDIOLOGY | Facility: HOSPITAL | Age: 51
End: 2021-04-02

## 2021-04-02 VITALS
HEIGHT: 64 IN | HEART RATE: 71 BPM | TEMPERATURE: 98.2 F | SYSTOLIC BLOOD PRESSURE: 118 MMHG | BODY MASS INDEX: 28.72 KG/M2 | WEIGHT: 168.21 LBS | OXYGEN SATURATION: 94 % | RESPIRATION RATE: 18 BRPM | DIASTOLIC BLOOD PRESSURE: 72 MMHG

## 2021-04-02 DIAGNOSIS — B34.8 RHINOVIRUS INFECTION: Primary | ICD-10-CM

## 2021-04-02 DIAGNOSIS — R05.9 COUGH: ICD-10-CM

## 2021-04-02 DIAGNOSIS — R06.00 DYSPNEA, UNSPECIFIED TYPE: ICD-10-CM

## 2021-04-02 DIAGNOSIS — R07.89 CHEST TIGHTNESS: ICD-10-CM

## 2021-04-02 LAB
ALBUMIN SERPL-MCNC: 4.2 G/DL (ref 3.5–5.2)
ALBUMIN/GLOB SERPL: 1.5 G/DL
ALP SERPL-CCNC: 64 U/L (ref 39–117)
ALT SERPL W P-5'-P-CCNC: 17 U/L (ref 1–33)
ANION GAP SERPL CALCULATED.3IONS-SCNC: 10 MMOL/L (ref 5–15)
AST SERPL-CCNC: 17 U/L (ref 1–32)
B PARAPERT DNA SPEC QL NAA+PROBE: NOT DETECTED
B PERT DNA SPEC QL NAA+PROBE: NOT DETECTED
BASOPHILS # BLD AUTO: 0 10*3/MM3 (ref 0–0.2)
BASOPHILS NFR BLD AUTO: 0.6 % (ref 0–1.5)
BH CV LOWER VASCULAR LEFT COMMON FEMORAL AUGMENT: NORMAL
BH CV LOWER VASCULAR LEFT COMMON FEMORAL COMPETENT: NORMAL
BH CV LOWER VASCULAR LEFT COMMON FEMORAL COMPRESS: NORMAL
BH CV LOWER VASCULAR LEFT COMMON FEMORAL PHASIC: NORMAL
BH CV LOWER VASCULAR LEFT COMMON FEMORAL SPONT: NORMAL
BH CV LOWER VASCULAR RIGHT COMMON FEMORAL AUGMENT: NORMAL
BH CV LOWER VASCULAR RIGHT COMMON FEMORAL COMPETENT: NORMAL
BH CV LOWER VASCULAR RIGHT COMMON FEMORAL COMPRESS: NORMAL
BH CV LOWER VASCULAR RIGHT COMMON FEMORAL PHASIC: NORMAL
BH CV LOWER VASCULAR RIGHT COMMON FEMORAL SPONT: NORMAL
BH CV LOWER VASCULAR RIGHT DISTAL FEMORAL COMPRESS: NORMAL
BH CV LOWER VASCULAR RIGHT GASTRONEMIUS COMPRESS: NORMAL
BH CV LOWER VASCULAR RIGHT GREATER SAPH AK COMPRESS: NORMAL
BH CV LOWER VASCULAR RIGHT GREATER SAPH BK COMPRESS: NORMAL
BH CV LOWER VASCULAR RIGHT LESSER SAPH COMPRESS: NORMAL
BH CV LOWER VASCULAR RIGHT MID FEMORAL AUGMENT: NORMAL
BH CV LOWER VASCULAR RIGHT MID FEMORAL COMPETENT: NORMAL
BH CV LOWER VASCULAR RIGHT MID FEMORAL COMPRESS: NORMAL
BH CV LOWER VASCULAR RIGHT MID FEMORAL PHASIC: NORMAL
BH CV LOWER VASCULAR RIGHT MID FEMORAL SPONT: NORMAL
BH CV LOWER VASCULAR RIGHT PERONEAL COMPRESS: NORMAL
BH CV LOWER VASCULAR RIGHT POPLITEAL AUGMENT: NORMAL
BH CV LOWER VASCULAR RIGHT POPLITEAL COMPETENT: NORMAL
BH CV LOWER VASCULAR RIGHT POPLITEAL COMPRESS: NORMAL
BH CV LOWER VASCULAR RIGHT POPLITEAL PHASIC: NORMAL
BH CV LOWER VASCULAR RIGHT POPLITEAL SPONT: NORMAL
BH CV LOWER VASCULAR RIGHT POSTERIOR TIBIAL COMPRESS: NORMAL
BH CV LOWER VASCULAR RIGHT PROXIMAL FEMORAL COMPRESS: NORMAL
BH CV LOWER VASCULAR RIGHT SAPHENOFEMORAL JUNCTION COMPRESS: NORMAL
BILIRUB SERPL-MCNC: 0.4 MG/DL (ref 0–1.2)
BUN SERPL-MCNC: 11 MG/DL (ref 6–20)
BUN/CREAT SERPL: 14.1 (ref 7–25)
C PNEUM DNA NPH QL NAA+NON-PROBE: NOT DETECTED
CALCIUM SPEC-SCNC: 9.4 MG/DL (ref 8.6–10.5)
CHLORIDE SERPL-SCNC: 108 MMOL/L (ref 98–107)
CO2 SERPL-SCNC: 25 MMOL/L (ref 22–29)
CREAT SERPL-MCNC: 0.78 MG/DL (ref 0.57–1)
D DIMER PPP FEU-MCNC: 0.4 MG/L (FEU) (ref 0–0.59)
DEPRECATED RDW RBC AUTO: 44.2 FL (ref 37–54)
EOSINOPHIL # BLD AUTO: 0.2 10*3/MM3 (ref 0–0.4)
EOSINOPHIL NFR BLD AUTO: 2 % (ref 0.3–6.2)
ERYTHROCYTE [DISTWIDTH] IN BLOOD BY AUTOMATED COUNT: 13.6 % (ref 12.3–15.4)
FLUAV SUBTYP SPEC NAA+PROBE: NOT DETECTED
FLUBV RNA ISLT QL NAA+PROBE: NOT DETECTED
GFR SERPL CREATININE-BSD FRML MDRD: 78 ML/MIN/1.73
GLOBULIN UR ELPH-MCNC: 2.8 GM/DL
GLUCOSE SERPL-MCNC: 101 MG/DL (ref 65–99)
HADV DNA SPEC NAA+PROBE: NOT DETECTED
HCOV 229E RNA SPEC QL NAA+PROBE: NOT DETECTED
HCOV HKU1 RNA SPEC QL NAA+PROBE: NOT DETECTED
HCOV NL63 RNA SPEC QL NAA+PROBE: NOT DETECTED
HCOV OC43 RNA SPEC QL NAA+PROBE: NOT DETECTED
HCT VFR BLD AUTO: 42.3 % (ref 34–46.6)
HGB BLD-MCNC: 14.3 G/DL (ref 12–15.9)
HMPV RNA NPH QL NAA+NON-PROBE: NOT DETECTED
HOLD SPECIMEN: NORMAL
HPIV1 RNA SPEC QL NAA+PROBE: NOT DETECTED
HPIV2 RNA SPEC QL NAA+PROBE: NOT DETECTED
HPIV3 RNA NPH QL NAA+PROBE: NOT DETECTED
HPIV4 P GENE NPH QL NAA+PROBE: NOT DETECTED
LDH SERPL-CCNC: 198 U/L (ref 135–214)
LYMPHOCYTES # BLD AUTO: 3.4 10*3/MM3 (ref 0.7–3.1)
LYMPHOCYTES NFR BLD AUTO: 38.8 % (ref 19.6–45.3)
M PNEUMO IGG SER IA-ACNC: NOT DETECTED
MCH RBC QN AUTO: 32.3 PG (ref 26.6–33)
MCHC RBC AUTO-ENTMCNC: 33.9 G/DL (ref 31.5–35.7)
MCV RBC AUTO: 95.2 FL (ref 79–97)
MONOCYTES # BLD AUTO: 0.5 10*3/MM3 (ref 0.1–0.9)
MONOCYTES NFR BLD AUTO: 6.2 % (ref 5–12)
NEUTROPHILS NFR BLD AUTO: 4.5 10*3/MM3 (ref 1.7–7)
NEUTROPHILS NFR BLD AUTO: 52.4 % (ref 42.7–76)
NRBC BLD AUTO-RTO: 0 /100 WBC (ref 0–0.2)
NT-PROBNP SERPL-MCNC: 28.5 PG/ML (ref 0–900)
PLATELET # BLD AUTO: 220 10*3/MM3 (ref 140–450)
PMV BLD AUTO: 8.9 FL (ref 6–12)
POTASSIUM SERPL-SCNC: 4.1 MMOL/L (ref 3.5–5.2)
PROCALCITONIN SERPL-MCNC: 0.04 NG/ML (ref 0–0.25)
PROT SERPL-MCNC: 7 G/DL (ref 6–8.5)
RBC # BLD AUTO: 4.44 10*6/MM3 (ref 3.77–5.28)
RHINOVIRUS RNA SPEC NAA+PROBE: DETECTED
RSV RNA NPH QL NAA+NON-PROBE: NOT DETECTED
SARS-COV-2 RNA NPH QL NAA+NON-PROBE: NOT DETECTED
SODIUM SERPL-SCNC: 143 MMOL/L (ref 136–145)
TROPONIN T SERPL-MCNC: <0.01 NG/ML (ref 0–0.03)
WBC # BLD AUTO: 8.7 10*3/MM3 (ref 3.4–10.8)

## 2021-04-02 PROCEDURE — 84145 PROCALCITONIN (PCT): CPT | Performed by: PHYSICIAN ASSISTANT

## 2021-04-02 PROCEDURE — 80053 COMPREHEN METABOLIC PANEL: CPT | Performed by: PHYSICIAN ASSISTANT

## 2021-04-02 PROCEDURE — 83880 ASSAY OF NATRIURETIC PEPTIDE: CPT | Performed by: PHYSICIAN ASSISTANT

## 2021-04-02 PROCEDURE — 99284 EMERGENCY DEPT VISIT MOD MDM: CPT

## 2021-04-02 PROCEDURE — 85379 FIBRIN DEGRADATION QUANT: CPT | Performed by: PHYSICIAN ASSISTANT

## 2021-04-02 PROCEDURE — 99283 EMERGENCY DEPT VISIT LOW MDM: CPT

## 2021-04-02 PROCEDURE — 94664 DEMO&/EVAL PT USE INHALER: CPT

## 2021-04-02 PROCEDURE — 93971 EXTREMITY STUDY: CPT

## 2021-04-02 PROCEDURE — 0202U NFCT DS 22 TRGT SARS-COV-2: CPT | Performed by: PHYSICIAN ASSISTANT

## 2021-04-02 PROCEDURE — 84484 ASSAY OF TROPONIN QUANT: CPT | Performed by: PHYSICIAN ASSISTANT

## 2021-04-02 PROCEDURE — 83615 LACTATE (LD) (LDH) ENZYME: CPT | Performed by: PHYSICIAN ASSISTANT

## 2021-04-02 PROCEDURE — 93005 ELECTROCARDIOGRAM TRACING: CPT | Performed by: PHYSICIAN ASSISTANT

## 2021-04-02 PROCEDURE — 94640 AIRWAY INHALATION TREATMENT: CPT

## 2021-04-02 PROCEDURE — 71045 X-RAY EXAM CHEST 1 VIEW: CPT

## 2021-04-02 PROCEDURE — 85025 COMPLETE CBC W/AUTO DIFF WBC: CPT | Performed by: PHYSICIAN ASSISTANT

## 2021-04-02 RX ORDER — BROMPHENIRAMINE MALEATE, PSEUDOEPHEDRINE HYDROCHLORIDE, AND DEXTROMETHORPHAN HYDROBROMIDE 2; 30; 10 MG/5ML; MG/5ML; MG/5ML
5 SYRUP ORAL 4 TIMES DAILY PRN
Qty: 473 ML | Refills: 0 | Status: SHIPPED | OUTPATIENT
Start: 2021-04-02

## 2021-04-02 RX ORDER — BENZONATATE 100 MG/1
200 CAPSULE ORAL ONCE
Status: COMPLETED | OUTPATIENT
Start: 2021-04-02 | End: 2021-04-02

## 2021-04-02 RX ORDER — ALBUTEROL SULFATE 90 UG/1
2 AEROSOL, METERED RESPIRATORY (INHALATION) EVERY 4 HOURS PRN
Qty: 6.7 G | Refills: 0 | Status: SHIPPED | OUTPATIENT
Start: 2021-04-02

## 2021-04-02 RX ORDER — ALBUTEROL SULFATE 90 UG/1
2 AEROSOL, METERED RESPIRATORY (INHALATION) ONCE
Status: COMPLETED | OUTPATIENT
Start: 2021-04-02 | End: 2021-04-02

## 2021-04-02 RX ORDER — SODIUM CHLORIDE 0.9 % (FLUSH) 0.9 %
10 SYRINGE (ML) INJECTION AS NEEDED
Status: DISCONTINUED | OUTPATIENT
Start: 2021-04-02 | End: 2021-04-02 | Stop reason: HOSPADM

## 2021-04-02 RX ORDER — BENZONATATE 200 MG/1
200 CAPSULE ORAL 3 TIMES DAILY PRN
Qty: 30 CAPSULE | Refills: 0 | Status: SHIPPED | OUTPATIENT
Start: 2021-04-02

## 2021-04-02 RX ADMIN — BENZONATATE 200 MG: 100 CAPSULE ORAL at 15:04

## 2021-04-02 RX ADMIN — ALBUTEROL SULFATE 2 PUFF: 108 AEROSOL, METERED RESPIRATORY (INHALATION) at 15:01

## 2021-04-02 NOTE — ED PROVIDER NOTES
Subjective   Patient is a 51-year-old female who presents with complaints of dry cough, generalized fatigue, chest tightness, shortness of breath, headache, and voice change for the past 2 days. She denies any recent travel or known sick contacts. She states her chest tightness is worse with coughing seems to be left-sided rated a 4/10 in severity and states it is intermittent. She does report some shortness of breath is worse with her coughing spells and exertion. States her headache is across her forehead she denies thunderclap or worst headache of her life. She states she does have a history of migraines and feels very similar. No neck pain or stiffness no lethargy. She denies any recent surgeries or immobilization. Patient does report some right lower extremity edema that she noticed today she does report some pain in her calf a few days ago. Patient is not on any blood thinners. Patient states that she was recently treated for shingles on her left lower extremity about 8 to 9 days ago she states she just finished her antiviral medication and does report improvement of her shingles she also reports she got her first Covid vaccine last week. Reports she did have COVID-19 back in July of last year.          Review of Systems   Constitutional: Negative.    HENT: Positive for rhinorrhea and voice change. Negative for congestion, dental problem, ear discharge, ear pain, facial swelling, postnasal drip, sinus pressure, sinus pain, sneezing, sore throat and trouble swallowing.    Eyes: Negative for photophobia and visual disturbance.   Respiratory: Positive for cough, chest tightness and shortness of breath. Negative for apnea, choking, wheezing and stridor.    Cardiovascular: Positive for leg swelling. Negative for chest pain and palpitations.   Gastrointestinal: Negative for abdominal distention, abdominal pain, constipation, diarrhea, nausea and vomiting.   Genitourinary: Negative.    Musculoskeletal: Negative for  back pain, neck pain and neck stiffness.   Skin: Negative.    Neurological: Positive for headaches. Negative for dizziness, tremors, seizures, syncope, facial asymmetry, speech difficulty, weakness, light-headedness and numbness.       Past Medical History:   Diagnosis Date   • Asthma    • Colitis    • Fibromyalgia    • Interstitial cystitis    • Irritable bowel syndrome    • Migraine        Allergies   Allergen Reactions   • Pollen Extract Hives, Itching, Rash, Shortness Of Breath and Swelling   • Tolu Grass Pollen Allergen Itching, Rash and Shortness Of Breath   • Hydromorphone Hives     Pruritic rash   • Hyoscyamine Itching   • Hyoscyamine Sulfate Itching and Nausea And Vomiting   • Oxycodone Itching     Itching rash   • Adhesive Tape Rash     Peels skin   • Beef Allergy Hives, Nausea Only, Rash and Swelling   • Celery Hives, Itching, Rash and Swelling   • Chicken Allergy Itching, Nausea Only, Rash and Photosensitivity   • Codeine Itching, Nausea Only and Rash   • Dust Mite Extract Hives, Itching, Rash and Swelling   • Food Hives, Itching, Rash and Swelling     HAS FOOD ALLERGIES   • Jacquie Hives, Itching, Rash and Swelling   • Hydrocodone Itching and Rash   • Morphine Hives, Itching, Nausea Only, Rash and Swelling   • Nuts Hives, Itching, Rash and Swelling   • Other Hives, Itching, Nausea Only, Rash and Swelling     Potatoes, greens beans, and tuna fish, celery   • Tuna Flavor Hives, Itching, Rash and Swelling   • Turkey Hives, Itching, Rash and Swelling   • Vancomycin Hives, Itching, Nausea And Vomiting, Rash and Swelling   • Vanilla Hives, Rash and Swelling       Past Surgical History:   Procedure Laterality Date   • CHOLECYSTECTOMY     • EYE SURGERY     • HEMORRHOIDECTOMY     • HYSTERECTOMY         No family history on file.    Social History     Socioeconomic History   • Marital status:      Spouse name: Not on file   • Number of children: Not on file   • Years of education: Not on file   •  Highest education level: Not on file   Tobacco Use   • Smoking status: Never Smoker   Substance and Sexual Activity   • Alcohol use: No   • Drug use: No           Objective   Physical Exam  Vitals and nursing note reviewed.   Constitutional:       General: She is not in acute distress.     Appearance: She is well-developed. She is not ill-appearing, toxic-appearing or diaphoretic.   HENT:      Head: Normocephalic and atraumatic.      Right Ear: Tympanic membrane, ear canal and external ear normal.      Left Ear: Tympanic membrane, ear canal and external ear normal.      Nose: Nose normal.      Mouth/Throat:      Mouth: Mucous membranes are moist.      Pharynx: Oropharynx is clear. Uvula midline. No pharyngeal swelling, oropharyngeal exudate, posterior oropharyngeal erythema or uvula swelling.      Tonsils: No tonsillar exudate or tonsillar abscesses.      Comments: Patient's voice is hoarse but airway is patent no significant erythema or edema noted of the posterior pharynx.  Eyes:      General: No scleral icterus.     Extraocular Movements: Extraocular movements intact.      Pupils: Pupils are equal, round, and reactive to light.   Cardiovascular:      Rate and Rhythm: Normal rate and regular rhythm.      Pulses: Normal pulses.      Heart sounds: No murmur heard.   No friction rub. No gallop.    Pulmonary:      Effort: Pulmonary effort is normal. No respiratory distress.      Breath sounds: Normal breath sounds. No stridor. No decreased breath sounds, wheezing, rhonchi or rales.   Chest:      Chest wall: No mass, lacerations, deformity, tenderness or crepitus.   Abdominal:      General: Bowel sounds are normal. There is no distension. There are no signs of injury.      Palpations: Abdomen is soft. There is no mass.      Tenderness: There is no abdominal tenderness. There is no right CVA tenderness, left CVA tenderness, guarding or rebound.      Hernia: No hernia is present.   Musculoskeletal:      Cervical back:  "Full passive range of motion without pain, normal range of motion and neck supple. No rigidity or crepitus. No pain with movement, spinous process tenderness or muscular tenderness.      Right lower leg: Tenderness present. No swelling. No edema.      Left lower leg: No swelling. No edema.      Comments: Normal range of motion of all joints of the right lower extremity.  Peripheral pulses are intact compartments are soft bilaterally.  Positive Homans' sign on the right lower extremity.  There is no significant edema erythema or warmth noted.   Lymphadenopathy:      Head:      Right side of head: No submental, submandibular, tonsillar, preauricular, posterior auricular or occipital adenopathy.      Left side of head: No submental, submandibular, tonsillar, preauricular, posterior auricular or occipital adenopathy.      Cervical: No cervical adenopathy.   Skin:     General: Skin is warm.      Capillary Refill: Capillary refill takes less than 2 seconds.      Coloration: Skin is not cyanotic, jaundiced or pale.      Findings: No rash.   Neurological:      General: No focal deficit present.      Mental Status: She is alert and oriented to person, place, and time.      GCS: GCS eye subscore is 4. GCS verbal subscore is 5. GCS motor subscore is 6.      Cranial Nerves: Cranial nerves are intact.      Sensory: Sensation is intact.      Comments: Normal and equal sensation strength throughout moving all extremities freely.   Psychiatric:         Mood and Affect: Mood normal.         Behavior: Behavior normal.         Procedures           ED Course    Blood pressure 118/67, pulse 79, temperature 98.6 °F (37 °C), temperature source Oral, resp. rate 22, height 162.6 cm (64\"), weight 76.3 kg (168 lb 3.4 oz), SpO2 93 %, not currently breastfeeding.    Medications   sodium chloride 0.9 % flush 10 mL (has no administration in time range)   albuterol sulfate HFA (PROVENTIL HFA;VENTOLIN HFA;PROAIR HFA) inhaler 2 puff (2 puffs " Inhalation Given 4/2/21 1501)   benzonatate (TESSALON) capsule 200 mg (200 mg Oral Given 4/2/21 1504)     Labs Reviewed   RESPIRATORY PANEL PCR W/ COVID-19 (SARS-COV-2) KENN/TAWNY/MARCELA/PAD/COR/[facility] IN-HOUSE, NP [facility], 3-4 HR TAT - Abnormal; Notable for the following components:       Result Value    Human Rhinovirus/Enterovirus Detected (*)     All other components within normal limits    Narrative:     Fact sheet for providers: https://docs.[employer]/wp-content/uploads/AZS1228-2689-ZN0.1-EUA-Provider-Fact-Sheet-3.pdf    Fact sheet for patients: https://docs.[employer]/wp-content/uploads/ZRW9834-3412-EZ2.1-EUA-Patient-Fact-Sheet-1.pdf    Test performed by PCR.   COMPREHENSIVE METABOLIC PANEL - Abnormal; Notable for the following components:    Glucose 101 (*)     Chloride 108 (*)     All other components within normal limits    Narrative:     GFR Normal >60  Chronic Kidney Disease <60  Kidney Failure <15     CBC WITH AUTO DIFFERENTIAL - Abnormal; Notable for the following components:    Lymphocytes, Absolute 3.40 (*)     All other components within normal limits   BNP (IN-HOUSE) - Normal    Narrative:     Among patients with dyspnea, NT-proBNP is highly sensitive for the detection of acute congestive heart failure. In addition NT-proBNP of <300 pg/ml effectively rules out acute congestive heart failure with 99% negative predictive value.    Results may be falsely decreased if patient taking Biotin.     TROPONIN (IN-HOUSE) - Normal    Narrative:     Troponin T Reference Range:  <= 0.03 ng/mL-   Negative for AMI  >0.03 ng/mL-     Abnormal for myocardial necrosis.  Clinicians would have to utilize clinical acumen, EKG, Troponin and serial changes to determine if it is an Acute Myocardial Infarction or myocardial injury due to an underlying chronic condition.       Results may be falsely decreased if patient taking Biotin.     D-DIMER, QUANTITATIVE - Normal    Narrative:     Reference  "Range  --------------------------------------------------------------------     < 0.50   Negative Predictive Value  0.50-0.59   Indeterminate    >= 0.60   Probable VTE             A very low percentage of patients with DVT may yield D-Dimer results   below the cut-off of 0.50 mg/L FEU.  This is known to be more   prevalent in patients with distal DVT.             Results of this test should always be interpreted in conjunction with   the patient's medical history, clinical presentation and other   findings.  Clinical diagnosis should not be based on the result of   INNOVANCE D-Dimer alone.   PROCALCITONIN - Normal    Narrative:     As a Marker for Sepsis (Non-Neonates):   1. <0.5 ng/mL represents a low risk of severe sepsis and/or septic shock.  1. >2 ng/mL represents a high risk of severe sepsis and/or septic shock.    As a Marker for Lower Respiratory Tract Infections that require antibiotic therapy:  PCT on Admission     Antibiotic Therapy             6-12 Hrs later  > 0.5                Strongly Recommended            >0.25 - <0.5         Recommended  0.1 - 0.25           Discouraged                   Remeasure/reassess PCT  <0.1                 Strongly Discouraged          Remeasure/reassess PCT      As 28 day mortality risk marker: \"Change in Procalcitonin Result\" (> 80 % or <=80 %) if Day 0 (or Day 1) and Day 4 values are available. Refer to http://www.[facility]-pct-calculator.com/   Change in PCT <=80 %   A decrease of PCT levels below or equal to 80 % defines a positive change in PCT test result representing a higher risk for 28-day all-cause mortality of patients diagnosed with severe sepsis or septic shock.  Change in PCT > 80 %   A decrease of PCT levels of more than 80 % defines a negative change in PCT result representing a lower risk for 28-day all-cause mortality of patients diagnosed with severe sepsis or septic shock.                Results may be falsely decreased if patient taking Biotin.  "   LACTATE DEHYDROGENASE - Normal   CBC AND DIFFERENTIAL    Narrative:     The following orders were created for panel order CBC & Differential.  Procedure                               Abnormality         Status                     ---------                               -----------         ------                     CBC Auto Differential[[id number]]        Abnormal            Final result                 Please view results for these tests on the individual orders.   EXTRA TUBES    Narrative:     The following orders were created for panel order Extra Tubes.  Procedure                               Abnormality         Status                     ---------                               -----------         ------                     Gold Top - SST[[id number]]                                   Final result                 Please view results for these tests on the individual orders.   GOLD TOP - SST     XR Chest 1 View    Result Date: 4/2/2021  No active disease, stable chest x-ray.  Electronically Signed By-Brady Jin MD On:4/2/2021 3:20 PM This report was finalized on [id number] by  Brady Jin MD.                      HEART Score: 2                      MDM  Number of Diagnoses or Management Options  Chest tightness  Cough  Dyspnea, unspecified type  Rhinovirus infection  Diagnosis management comments: Chart Review:  Comorbidity: As per past medical history  ECG: Interpreted by myself and Dr. Read shows sinus rhythm rate 71 low voltage in precordial leads otherwise normal EKG previous EKG was reviewed from 1/19/2013.  Labs: Respiratory panel positive for human rhinovirus.  Procalcitonin 0.04 troponin within normal limits BNP 20.58 D-dimer 0.4 .  CBC shows WBC 8.7 hemoglobin 14.3 hematocrit 42.3 platelets 220.  CMP showed glucose 101 BUN 11 creatinine 0.78 sodium 143 potassium 4.1  Imaging: Was interpreted by physician and reviewed by myself:  XR Chest 1 View  Result Date: 4/2/2021  No active disease,  stable chest x-ray.  Electronically Signed By-Brady Jin MD On:4/2/2021 3:20 PM This report was finalized on [id number] by  Brady Jin MD.    Disposition/Treatment:  Appropriate PPE was worn during exam and throughout all encounters with the patient.  While in the ED IV was placed and labs were obtained patient was given albuterol inhaler and Tessalon Perles for cough.  She was afebrile and appeared nontoxic in no respiratory distress speaking full sentences.  Patient in no acute cranial focal neural deficits or meningeal signs noted.  Patient states her headache is similar to her normal migraines and no significant change not the worst headache of her life.  EKG showed no signs of acute STEMI troponin BNP D-dimer all within normal limits.  Chest x-ray showed no active disease.  Respiratory panel was significant for human rhinovirus.  Otherwise blood work was fairly unremarkable as above.  Patient symptoms secondary to her acute rhinovirus lab results and findings were discussed with the patient who voiced understanding of discharge instructions along with signs and symptoms requiring return to the ED.  Upon discharged patient was in stable condition with followup for a revaluation.        Amount and/or Complexity of Data Reviewed  Clinical lab tests: reviewed  Tests in the radiology section of CPT®: reviewed  Tests in the medicine section of CPT®: reviewed        Final diagnoses:   Rhinovirus infection   Cough   Dyspnea, unspecified type   Chest tightness       ED Disposition  ED Disposition     ED Disposition Condition Comment    Discharge Stable           No follow-up provider specified.       Medication List      No changes were made to your prescriptions during this visit.          Nurys Ward PA  04/02/21 2339

## 2021-04-02 NOTE — DISCHARGE INSTRUCTIONS
Take Bromfed and Tessalon Perles as needed for cough.  Use inhaler as needed for shortness of breath.    Drink plenty of clear fluids and get plenty of rest over the next 2 to 3 days.  Warm salt water gargles 2-3 times daily as needed for sore throat.  You may also use over-the-counter throat lozenges or Chloraseptic throat spray.    Tylenol or ibuprofen as needed for fever or pain.    Follow-up with your primary care provider in 3-5 days.  If you do not have a primary care provider call [phone] for help in finding one, or you may follow up with [facility] at [phone].    Return to ED for any new or worsening symptoms

## 2021-04-05 LAB — QT INTERVAL: 417 MS

## 2022-05-19 ENCOUNTER — LAB REQUISITION (OUTPATIENT)
Dept: LAB | Facility: HOSPITAL | Age: 52
End: 2022-05-19

## 2022-05-19 DIAGNOSIS — N20.1 CALCULUS OF URETER: ICD-10-CM

## 2022-05-19 PROCEDURE — 82365 CALCULUS SPECTROSCOPY: CPT | Performed by: UROLOGY

## 2022-05-24 LAB
CALCIUM OXALATE DIHYDRATE MFR STONE IR: 10 %
COLOR STONE: NORMAL
COM MFR STONE: 90 %
COMPN STONE: NORMAL
LABORATORY COMMENT REPORT: NORMAL
Lab: NORMAL
Lab: NORMAL
PHOTO: NORMAL
SIZE STONE: NORMAL MM
SPEC SOURCE SUBJ: NORMAL
WT STONE: 21 MG

## 2023-02-01 ENCOUNTER — OFFICE (AMBULATORY)
Dept: RURAL CLINIC 3 | Facility: CLINIC | Age: 53
End: 2023-02-01

## 2023-02-01 VITALS
SYSTOLIC BLOOD PRESSURE: 143 MMHG | WEIGHT: 175 LBS | DIASTOLIC BLOOD PRESSURE: 90 MMHG | HEIGHT: 64 IN | HEART RATE: 76 BPM

## 2023-02-01 DIAGNOSIS — R19.7 DIARRHEA, UNSPECIFIED: ICD-10-CM

## 2023-02-01 DIAGNOSIS — R12 HEARTBURN: ICD-10-CM

## 2023-02-01 DIAGNOSIS — N30.10 INTERSTITIAL CYSTITIS (CHRONIC) WITHOUT HEMATURIA: ICD-10-CM

## 2023-02-01 DIAGNOSIS — Z90.49 ACQUIRED ABSENCE OF OTHER SPECIFIED PARTS OF DIGESTIVE TRACT: ICD-10-CM

## 2023-02-01 DIAGNOSIS — R10.84 GENERALIZED ABDOMINAL PAIN: ICD-10-CM

## 2023-02-01 DIAGNOSIS — R11.0 NAUSEA: ICD-10-CM

## 2023-02-01 DIAGNOSIS — M79.7 FIBROMYALGIA: ICD-10-CM

## 2023-02-01 DIAGNOSIS — R14.0 ABDOMINAL DISTENSION (GASEOUS): ICD-10-CM

## 2023-02-01 DIAGNOSIS — R15.2 FECAL URGENCY: ICD-10-CM

## 2023-02-01 DIAGNOSIS — R49.0 DYSPHONIA: ICD-10-CM

## 2023-02-01 DIAGNOSIS — K58.1 IRRITABLE BOWEL SYNDROME WITH CONSTIPATION: ICD-10-CM

## 2023-02-01 PROCEDURE — 99203 OFFICE O/P NEW LOW 30 MIN: CPT | Performed by: INTERNAL MEDICINE

## 2023-02-14 ENCOUNTER — OFFICE (AMBULATORY)
Dept: URBAN - METROPOLITAN AREA LAB 2 | Facility: LAB | Age: 53
End: 2023-02-14
Payer: MEDICARE

## 2023-02-14 DIAGNOSIS — R19.7 DIARRHEA, UNSPECIFIED: ICD-10-CM

## 2023-02-14 PROCEDURE — 87324 CLOSTRIDIUM AG IA: CPT | Mod: 59 | Performed by: INTERNAL MEDICINE

## 2023-02-14 PROCEDURE — 87449 NOS EACH ORGANISM AG IA: CPT | Performed by: INTERNAL MEDICINE

## 2023-08-17 ENCOUNTER — HOSPITAL ENCOUNTER (EMERGENCY)
Facility: HOSPITAL | Age: 53
Discharge: HOME | End: 2023-08-17
Payer: MEDICARE

## 2023-08-17 VITALS
TEMPERATURE: 98.5 F | DIASTOLIC BLOOD PRESSURE: 72 MMHG | RESPIRATION RATE: 18 BRPM | HEIGHT: 63 IN | BODY MASS INDEX: 29.06 KG/M2 | WEIGHT: 164 LBS | HEART RATE: 73 BPM | OXYGEN SATURATION: 98 % | SYSTOLIC BLOOD PRESSURE: 116 MMHG

## 2023-08-17 DIAGNOSIS — L03.032 CELLULITIS OF GREAT TOE OF LEFT FOOT: ICD-10-CM

## 2023-08-17 DIAGNOSIS — L03.032 PARONYCHIA OF GREAT TOE OF LEFT FOOT: Primary | ICD-10-CM

## 2023-08-17 PROCEDURE — 99283 EMERGENCY DEPT VISIT LOW MDM: CPT

## 2023-08-17 PROCEDURE — 87147 CULTURE TYPE IMMUNOLOGIC: CPT

## 2023-08-17 PROCEDURE — 87186 SC STD MICRODIL/AGAR DIL: CPT

## 2023-08-17 PROCEDURE — 87070 CULTURE OTHR SPECIMN AEROBIC: CPT

## 2023-08-17 PROCEDURE — 87205 SMEAR GRAM STAIN: CPT

## 2023-08-17 RX ORDER — CEPHALEXIN 500 MG/1
500 CAPSULE ORAL 4 TIMES DAILY
Qty: 28 CAPSULE | Refills: 0 | Status: SHIPPED | OUTPATIENT
Start: 2023-08-17 | End: 2023-08-19

## 2023-08-17 RX ORDER — CEPHALEXIN 500 MG/1
500 CAPSULE ORAL 4 TIMES DAILY
Qty: 28 CAPSULE | Refills: 0 | Status: SHIPPED | OUTPATIENT
Start: 2023-08-17 | End: 2023-08-17 | Stop reason: SDUPTHER

## 2023-08-17 NOTE — DISCHARGE INSTRUCTIONS
Follow up with   Take antibiotics as prescribed    Return to the ED for new or worsening symptoms     Use bulky dressings to protect and keep the areas clean and dry

## 2023-08-19 LAB
BACTERIA SPEC AEROBE CULT: ABNORMAL
GRAM STN SPEC: ABNORMAL
GRAM STN SPEC: ABNORMAL

## 2023-08-19 RX ORDER — SULFAMETHOXAZOLE AND TRIMETHOPRIM 800; 160 MG/1; MG/1
1 TABLET ORAL 2 TIMES DAILY
Qty: 14 TABLET | Refills: 0 | Status: SHIPPED | OUTPATIENT
Start: 2023-08-19 | End: 2023-08-26

## 2023-08-19 NOTE — PROGRESS NOTES
Patient wound culture resulted with MRSA. Susceptible to Sulfonamides. Patient was given Rx for cephalexin. Therapy is insufficient coverage. Discussed with Mary Arita NP. New prescription for sulfamethoxazole/ trimethoprim e-scribed to the patient's preferred pharmacy: [facility]. Spoke with patient, she was instructed to stop taking cephalexin and start taking Bactrim Rx. Patient agreed with treatment plan.    Microbiology Results (last 10 days)       Procedure Component Value - Date/Time    Wound Culture - Wound, Toe [[id number]]  (Abnormal)  (Susceptibility) Collected: 08/17/23 1630    Lab Status: Final result Specimen: Wound from Toe Updated: 08/19/23 1007     Wound Culture Scant growth (1+) Staphylococcus aureus, MRSA     Comment:   Methicillin resistant Staphylococcus aureus, Patient may be an isolation risk.        Gram Stain Rare (1+) WBCs per low power field      No organisms seen    Susceptibility        Staphylococcus aureus, MRSA      SHERRI      Clindamycin Susceptible      Erythromycin Resistant      Inducible Clindamycin Resistance Negative      Oxacillin Resistant      Rifampin Susceptible      Tetracycline Susceptible      Trimethoprim + Sulfamethoxazole Susceptible      Vancomycin Susceptible                       Susceptibility Comments       Staphylococcus aureus, MRSA    This isolate does not demonstrate inducible clindamycin resistance in vitro.                         Stormy Simon RPH  8/19/2023 12:15 EDT

## 2023-08-23 NOTE — ED PROVIDER NOTES
Subjective   History of Present Illness  Chief Complaint: Wound on toe      HPI: Patient is a 53-year-old female presents emergency room by private vehicle, known history of diabetes, states a few months ago she had a paronychia in her right toe, developed cellulitis and in turn had sepsis was admitted for IV antibiotics returns to the emergency room today for ingrown toenail with wound to the left great toe she states she noticed it 2 days ago she is having no nausea or vomiting, no fevers.  She does have an established podiatrist.    PCP: Kimberly   Podiatry: Linda    History provided by:  Patient    Review of Systems   Skin:  Positive for wound.     Past Medical History:   Diagnosis Date    Asthma     Colitis     Fibromyalgia     Interstitial cystitis     Irritable bowel syndrome     Migraine        Allergies   Allergen Reactions    Pollen Extract Hives, Itching, Rash, Shortness Of Breath and Swelling    Tolu Grass Pollen Allergen Itching, Rash and Shortness Of Breath    Hydromorphone Hives     Pruritic rash    Hyoscyamine Itching    Hyoscyamine Sulfate Itching and Nausea And Vomiting    Oxycodone Itching     Itching rash    Adhesive Tape Rash     Peels skin    Beef Allergy Hives, Nausea Only, Rash and Swelling    Celery Hives, Itching, Rash and Swelling    Chicken Allergy Itching, Nausea Only, Rash and Photosensitivity    Codeine Itching, Nausea Only and Rash    Dust Mite Extract Hives, Itching, Rash and Swelling    Food Hives, Itching, Rash and Swelling     HAS FOOD ALLERGIES    Jacquie Hives, Itching, Rash and Swelling    Hydrocodone Itching and Rash    Morphine Hives, Itching, Nausea Only, Rash and Swelling    Nuts Hives, Itching, Rash and Swelling    Other Hives, Itching, Nausea Only, Rash and Swelling     Potatoes, greens beans, and tuna fish, celery    Tuna Flavor Hives, Itching, Rash and Swelling    Turkey Hives, Itching, Rash and Swelling    Vancomycin Hives, Itching, Nausea And Vomiting, Rash and  "Swelling    Vanilla Hives, Rash and Swelling       Past Surgical History:   Procedure Laterality Date    CHOLECYSTECTOMY      EYE SURGERY      HEMORRHOIDECTOMY      HYSTERECTOMY         No family history on file.    Social History     Socioeconomic History    Marital status:    Tobacco Use    Smoking status: Never   Substance and Sexual Activity    Alcohol use: No    Drug use: No           Objective   Physical Exam  Cardiovascular:      Pulses:           Dorsalis pedis pulses are 2+ on the right side and 2+ on the left side.   Feet:      Left foot:      Toenail Condition: Left toenails are ingrown.      Comments: Missing toenail to right great toe     See images placed in chart.  There is no streaking or warmth into the joint       Procedures           ED Course      /72 (BP Location: Left arm, Patient Position: Sitting)   Pulse 73   Temp 98.5 øF (36.9 øC) (Temporal)   Resp 18   Ht 160 cm (63\")   Wt 74.4 kg (164 lb)   SpO2 98%   BMI 29.05 kg/mý   Labs Reviewed   WOUND CULTURE - Abnormal; Notable for the following components:       Result Value    Wound Culture Scant growth (1+) Staphylococcus aureus, MRSA (*)     All other components within normal limits     Medications - No data to display  No radiology results for the last day                                       Medical Decision Making  As patient presented to the emergency room with erythema and wound to the left great toe on physical exam it does appear that she has a paronychia with surrounding cellulitis, wound culture was obtained, pending.  With her history of diabetes as well as more remote osteomyelitis to the right great toe, she was started on antibiotics and advised to follow-up with her podiatrist first thing in the morning.  Symptom onset within the last 2 days.  With review of her chart she responded well to outpatient Keflex at her discharge, this will be sent to her pharmacy and strongly encouraged to follow-up with her " specialist as well as her PCP.  Patient gave verbal understanding of discharge instructions, she was alert oriented nontoxic in appearance time of discharge, denied further questions or complaints.    Chart review: 7/13/2023 inpatient at [facility], right hallux osteomyelitis, paronychia, Keflex throughout her admission she also received Cipro.      Note Disclaimer: At [facility], we believe that sharing information builds trust and better  relationships. You are receiving this note because you recently visited [facility]. It is possible you will see health information before a provider has talked with you about it. This kind of information can be easy to misunderstand. To help you fully understand what it means for your health, we urge you to discuss this note with your provider.       Part of this note may be an electronic transcription/translation of spoken language to printed text using the Dragon Dictation System.    Appropriate PPE worn during exam.    Problems Addressed:  Cellulitis of great toe of left foot: complicated acute illness or injury  Paronychia of great toe of left foot: complicated acute illness or injury    Amount and/or Complexity of Data Reviewed  Labs: ordered.        Final diagnoses:   Paronychia of great toe of left foot   Cellulitis of great toe of left foot       ED Disposition  ED Disposition       ED Disposition   Discharge    Condition   Stable    Comment   --               Anjali Harris, APRN  [address] [facility] [address]  [phone]          Leonel Mckeon MD  [address]  [facility] [address]  [facility] [address] [phone]               Medication List        New Prescriptions      sulfamethoxazole-trimethoprim 800-160 MG per tablet  Commonly known as: Bactrim DS  Take 1 tablet by mouth 2 (Two) Times a Day for 7 days.               Where to Get Your Medications        These medications were sent to [facility] #[id number] - [place], [place] - [address]  [facility] [phone] [phone] FX  [address] [place] [address]      Phone: [phone]   sulfamethoxazole-trimethoprim 800-160 MG per tablet            Brook Perkins, APRN  08/23/23 1417

## 2023-08-24 ENCOUNTER — OFFICE VISIT (OUTPATIENT)
Dept: PODIATRY | Facility: CLINIC | Age: 53
End: 2023-08-24
Payer: MEDICARE

## 2023-08-24 VITALS — OXYGEN SATURATION: 97 % | HEIGHT: 63 IN | HEART RATE: 77 BPM | BODY MASS INDEX: 29.06 KG/M2 | WEIGHT: 164 LBS

## 2023-08-24 DIAGNOSIS — L60.3 ONYCHODYSTROPHY: Primary | ICD-10-CM

## 2023-08-24 NOTE — PROGRESS NOTES
"08/24/2023  Foot and Ankle Surgery - New Patient   Provider: Dr. Leoncio Tucker DPM  Location: [facility]    Subjective:  Promise Miles is a 53 y.o. female.     Chief Complaint   Patient presents with    Left Foot - Nail Problem    Establish [facility]     RADHA Harris TEQUILA 07/2023       HPI: The patient is a 53-year-old female who presents to the office today for evaluation of left great toe infection.    The patient reports an onset of symptoms involving her left great toe last Thursday, 08/17/2023. Therefore, she presented to the hospital where the wound was cultured with results positive for MRSA. She notes she was initially prescribed Keflex until she was switched to Bactrim secondary to the positive MRSA results. The patient reports her symptoms onset secondary to an ingrown toenail which she picked at.     Currently, she states her left great toenail appears significantly improved at this time and adds that much of her toenail came off when she removed her sock. She reports she has been taking Bactrim and keeping the foot elevated and cleaned. The patient notes the area \"oozes\" and her tenderness is increased with increased activity. She recalls she went on a cruise in 05/2023, where she received a pedicure, and since that time, she has noticed frequent flaking. However, the patient does not believe this is related as her current symptoms did not appear until months later. She notes she did recently drop a can on her toes resulting in ecchymosis.        The patient adds that she was in the hospital in 07/2023 for issues involving her right great toe which resulted in Enterobacter in her bloodstream. She reports that Dr. Mckeon, podiatrist, performed a nail avulsion without chemical matrixectomy which is doing well.     The patient reports a history of fibromyalgia, gastric ulcers, GERD, and IBS. She adds that she may have diabetes as her A1c while in the hospital in 07/2023 was 7.2 percent. However, she " notes she had been on prednisone at that time; therefore, she will be re-evaluated by her primary care physician in a few weeks.     Allergies   Allergen Reactions    Pollen Extract Hives, Itching, Rash, Shortness Of Breath and Swelling    Tolu Grass Pollen Allergen Itching, Rash and Shortness Of Breath    Hydromorphone Hives     Pruritic rash    Hyoscyamine Itching    Hyoscyamine Sulfate Itching and Nausea And Vomiting    Oxycodone Itching     Itching rash    Adhesive Tape Rash     Peels skin    Beef Allergy Hives, Nausea Only, Rash and Swelling    Celery Hives, Itching, Rash and Swelling    Chicken Allergy Itching, Nausea Only, Rash and Photosensitivity    Codeine Itching, Nausea Only and Rash    Dust Mite Extract Hives, Itching, Rash and Swelling    Food Hives, Itching, Rash and Swelling     HAS FOOD ALLERGIES    Jacquie Hives, Itching, Rash and Swelling    Hydrocodone Itching and Rash    Morphine Hives, Itching, Nausea Only, Rash and Swelling    Nuts Hives, Itching, Rash and Swelling    Other Hives, Itching, Nausea Only, Rash and Swelling     Potatoes, greens beans, and tuna fish, celery    Tuna Flavor Hives, Itching, Rash and Swelling    Turkey Hives, Itching, Rash and Swelling    Vancomycin Hives, Itching, Nausea And Vomiting, Rash and Swelling    Vanilla Hives, Rash and Swelling       Past Medical History:   Diagnosis Date    Asthma     Colitis     Fibromyalgia     Interstitial cystitis     Irritable bowel syndrome     Migraine        Past Surgical History:   Procedure Laterality Date    CHOLECYSTECTOMY      EYE SURGERY      HEMORRHOIDECTOMY      HYSTERECTOMY         History reviewed. No pertinent family history.    Social History     Socioeconomic History    Marital status:    Tobacco Use    Smoking status: Never   Substance and Sexual Activity    Alcohol use: No    Drug use: No        Current Outpatient Medications on File Prior to Visit   Medication Sig Dispense Refill    albuterol sulfate HFA  108 (90 Base) MCG/ACT inhaler Inhale 2 puffs Every 4 (Four) Hours As Needed for Shortness of Air. 6.7 g 0    azelastine (ASTELIN) 0.1 % nasal spray 2 sprays into the nostril(s) as directed by provider 2 (Two) Times a Day. Use in each nostril as directed      DULoxetine (CYMBALTA) 30 MG capsule Take 2 capsules by mouth Daily.      furosemide (LASIX) 20 MG tablet Take 1 tablet by mouth Daily.      gabapentin (NEURONTIN) 600 MG tablet Take 1 tablet by mouth 3 (Three) Times a Day.      levocetirizine (XYZAL) 5 MG tablet Take 1 tablet by mouth Every Evening.      linaclotide (LINZESS) 290 MCG capsule capsule Take 1 capsule by mouth Every Morning Before Breakfast.      montelukast (SINGULAIR) 10 MG tablet Take 1 tablet by mouth Every Night.      ondansetron ODT (ZOFRAN-ODT) 4 MG disintegrating tablet Place 1 tablet on the tongue Every 8 (Eight) Hours As Needed for Nausea or Vomiting. 14 tablet 0    potassium chloride (K-DUR,KLOR-CON) 20 MEQ CR tablet Take 0.5 tablets by mouth 2 (Two) Times a Day.      sulfamethoxazole-trimethoprim (Bactrim DS) 800-160 MG per tablet Take 1 tablet by mouth 2 (Two) Times a Day for 7 days. 14 tablet 0    SUMAtriptan (IMITREX) 25 MG tablet Take 1 tablet by mouth Every 2 (Two) Hours As Needed for Migraine. Take one tablet at onset of headache. May repeat dose one time in 2 hours if headache not relieved.      benzonatate (TESSALON) 200 MG capsule Take 1 capsule by mouth 3 (Three) Times a Day As Needed for Cough. 30 capsule 0    brompheniramine-pseudoephedrine-DM 30-2-10 MG/5ML syrup Take 5 mL by mouth 4 (Four) Times a Day As Needed for Congestion or Cough. 473 mL 0    diclofenac (VOLTAREN) 75 MG EC tablet Take 1 tablet by mouth 2 (Two) Times a Day As Needed (pain). 20 tablet 0    olopatadine (PATANOL) 0.1 % ophthalmic solution Administer 1 drop to both eyes 2 (Two) Times a Day.      ondansetron ODT (ZOFRAN-ODT) 4 MG disintegrating tablet Take 4 mg by mouth Every 8 (Eight) Hours As Needed for  "Nausea or Vomiting.      pantoprazole (PROTONIX) 40 MG EC tablet Take 1 tablet by mouth Daily. 14 tablet 0    riFAXIMin (XIFAXAN) 550 MG tablet Take 550 mg by mouth Every 8 (Eight) Hours.      zafirlukast (ACCOLATE) 20 MG tablet Take 20 mg by mouth 2 (Two) Times a Day.       No current facility-administered medications on file prior to visit.       Review of Systems:  General: Denies fever, chills, fatigue, and weakness.  Eyes: Denies vision loss, blurry vision, and excessive redness.  ENT: Denies hearing issues and difficulty swallowing.  Cardiovascular: Denies palpitations, chest pain, or syncopal episodes.  Respiratory: Denies shortness of breath, wheezing, and coughing.  GI: Denies abdominal pain, nausea, and vomiting.  : Denies frequency, hematuria, and urgency.  Musculoskeletal: Denies muscle cramps, joint pains, and stiffness.   Derm: Denies rash, open wounds, or suspicious lesions. Partial nail loss involving the left great toe. Eschar formation to right great toe.   Neuro: Denies headaches, numbness, loss of coordination, and tremors.  Psych: Denies anxiety and depression.  Endocrine: Denies temperature intolerance and changes in appetite.  Heme: Denies bleeding disorders or abnormal bruising.    Objective   Pulse 77   Ht 160 cm (63\")   Wt 74.4 kg (164 lb)   SpO2 97%   BMI 29.05 kg/mý     Foot/Ankle Exam    GENERAL  Orientation:  AAOx3  Affect:  appropriate    VASCULAR     Right Foot Vascularity   Normal vascular exam    Dorsalis pedis:  2+  Posterior tibial:  2+  Skin temperature:  warm  Edema grading:  None  CFT:  < 3 seconds  Pedal hair growth:  Present  Varicosities:  none     Left Foot Vascularity   Normal vascular exam    Dorsalis pedis:  2+  Posterior tibial:  2+  Skin temperature:  warm  Edema grading:  None  CFT:  < 3 seconds  Pedal hair growth:  Present  Varicosities:  none     NEUROLOGIC     Right Foot Neurologic   Light touch sensation: normal  Hot/Cold sensation: normal  Achilles reflex:  " 2+     Left Foot Neurologic   Light touch sensation: normal  Hot/Cold sensation:  normal  Achilles reflex:  2+    MUSCULOSKELETAL     Right Foot Musculoskeletal   Arch:  Normal     Left Foot Musculoskeletal   Arch:  Normal    MUSCLE STRENGTH     Right Foot Muscle Strength   Normal strength    Foot dorsiflexion:  5  Foot plantar flexion:  5  Foot inversion:  5  Foot eversion:  5     Left Foot Muscle Strength   Normal strength    Foot dorsiflexion:  5  Foot plantar flexion:  5  Foot inversion:  5  Foot eversion:  5    DERMATOLOGIC      Right Foot Dermatologic   Skin  Right foot skin is intact.   Nails  1.  Absent. (No nail involving the right great toe with mild overlying eschar. )  Nails comment:  Nails 1-5     Left Foot Dermatologic   Skin  Left foot skin is intact.   Nails comment:  Nails 1-5  Nails  1.  (Partial nail loss involving the left great toe with no periwound erythema, edema, or signs of infection. No pain. No other complicating features.)    TESTS     Right Foot Tests   Anterior drawer: negative  Varus tilt: negative     Left Foot Tests   Anterior drawer: negative  Varus tilt: negative    Assessment & Plan   Diagnoses and all orders for this visit:    1. Onychodystrophy (Primary)        The patient presents to the office today for evaluation of left great toe infection. No results were obtained or interpreted today. She does appear to be healing well. Explained nothing overtly concerning is observed today. Advised her toenail may regrow abnormally given the recent traumas to her toenail. Discussed the possibility of onychomycosis development. Recommended performing Epsom salt soaks and allowing further time for the healing process. Recommended the patient avoid pedicures at this time. Advised the patient to call the office with any progressive symptoms as nail avulsion with chemical matrixectomy may be considered. The patient will return to the office in 6 weeks for re-evaluation. Greater than 30 minutes  was spent before, during, and after evaluation for patient care.    No orders of the defined types were placed in this encounter.       Note is dictated utilizing voice recognition software. Unfortunately this leads to occasional typographical errors. I apologize in advance if the situation occurs. If questions occur please do not hesitate to call our office.    Transcribed from ambient dictation for RADHA Tucker DPM by Tasneem Lombardo.  08/24/23   10:52 EDT    Patient or patient representative verbalized consent to the visit recording.  I have personally performed the services described in this document as transcribed by the above individual, and it is both accurate and complete.

## 2023-09-05 ENCOUNTER — TELEPHONE (OUTPATIENT)
Dept: PODIATRY | Facility: CLINIC | Age: 53
End: 2023-09-05

## 2023-09-05 NOTE — TELEPHONE ENCOUNTER
Hub staff attempted to follow warm transfer process and was unsuccessful     Caller: TEVIN     Relationship to patient: SELF     Best call back number: [phone]    Patient is needing: PT CALLED IN STATING DR GOLDSMITH TOLD HER ON SATURDAY 9/2/23 THAT SHE NEEDS TO SCHEDULE AN APPT WITH VIRIDIANA SAMS FOR INFECTED TOE , LOOKS LIKE PT HAS UPCOMING APPT FOR THIS ALREADY WITH DR GOLDSMITH. PLEASE ADVISE ON HOW TO SCHEDULE

## 2023-09-12 ENCOUNTER — OFFICE VISIT (OUTPATIENT)
Dept: PODIATRY | Facility: CLINIC | Age: 53
End: 2023-09-12
Payer: MEDICARE

## 2023-09-12 VITALS — WEIGHT: 164 LBS | HEIGHT: 63 IN | HEART RATE: 71 BPM | BODY MASS INDEX: 29.06 KG/M2 | OXYGEN SATURATION: 97 %

## 2023-09-12 DIAGNOSIS — L60.8 NONTRAUMATIC AVULSION OF TOENAIL: Primary | ICD-10-CM

## 2023-09-12 DIAGNOSIS — L60.3 ONYCHODYSTROPHY: ICD-10-CM

## 2023-09-12 PROCEDURE — 99212 OFFICE O/P EST SF 10 MIN: CPT

## 2023-09-12 NOTE — PROGRESS NOTES
"09/12/2023  Foot and Ankle Surgery - New Patient   Provider: TEQUILA Hoffman   Location: [facility]    Subjective:  Promise Miles is a 53 y.o. female.     Chief Complaint   Patient presents with    Left Foot - Follow-up     Infection in great toe     Right Foot - Follow-up     Infection in great toe     Follow-up     RADHA MANDEL 08/2023       Promise Miles presents today with concerns for her left great toe.     The patient states she is doing well. She saw Dr. Tucker in 08/2023 for a hospital follow-up with concerns for her great left toenail. He recommended Epsom salt soaks. The patient reports her left great toenail was getting \"nasty\" again when she made today's appointment. She adds it was yellow and had drainage. She finished the antibiotic for MRSA. She has not been on an antibiotic in the last couple of weeks. She states her left great toe looks good now. She states her left great toenail started coming off about 1 month ago. She had been walking and when she removed her sock a piece of her left great toenail came off. She states her left great toenail was never removed. She believes there is a piece of her left great toenail remaining. She notes it started bleeding and became inflamed after being on her feet yesterday for an extended period. She notes it goes down and gets a little raised. The patient states she was told not to wear \"these\" kind of shoes because they are not supportive. She states her tennis shoes rub her toe and causes irritation.    Previously she had an issue with her right great toe and went to the hospital. She affirms a bloodstream infection which was cultured and treated with an antibiotic. She had her right toe removed in the hospital. She states Dr. Tucker wanted to see how her toenails would grow back.     The patient is unsure if she is diabetic. She was in the hospital in 07/2023 and had been on prednisone. Her A1c was 6.8%. She is off prednisone now. She will " follow-up with her primary care provider next week to have her A1c rechecked. When she was in the hospital for her left great toe her blood glucose was 89 mg/dL, but she is unsure if it was fasting.     Allergies   Allergen Reactions    Pollen Extract Hives, Itching, Rash, Shortness Of Breath and Swelling    Tolu Grass Pollen Allergen Itching, Rash and Shortness Of Breath    Hydromorphone Hives     Pruritic rash    Hyoscyamine Itching    Hyoscyamine Sulfate Itching and Nausea And Vomiting    Oxycodone Itching     Itching rash    Adhesive Tape Rash     Peels skin    Beef Allergy Hives, Nausea Only, Rash and Swelling    Celery Hives, Itching, Rash and Swelling    Chicken Allergy Itching, Nausea Only, Rash and Photosensitivity    Codeine Itching, Nausea Only and Rash    Dust Mite Extract Hives, Itching, Rash and Swelling    Food Hives, Itching, Rash and Swelling     HAS FOOD ALLERGIES    Jacquie Hives, Itching, Rash and Swelling    Hydrocodone Itching and Rash    Morphine Hives, Itching, Nausea Only, Rash and Swelling    Nuts Hives, Itching, Rash and Swelling    Other Hives, Itching, Nausea Only, Rash and Swelling     Potatoes, greens beans, and tuna fish, celery    Tuna Flavor Hives, Itching, Rash and Swelling    Turkey Hives, Itching, Rash and Swelling    Vancomycin Hives, Itching, Nausea And Vomiting, Rash and Swelling    Vanilla Hives, Rash and Swelling       Past Medical History:   Diagnosis Date    Asthma     Colitis     Fibromyalgia     Interstitial cystitis     Irritable bowel syndrome     Migraine        Past Surgical History:   Procedure Laterality Date    CHOLECYSTECTOMY      EYE SURGERY      HEMORRHOIDECTOMY      HYSTERECTOMY         History reviewed. No pertinent family history.    Social History     Socioeconomic History    Marital status:    Tobacco Use    Smoking status: Never   Substance and Sexual Activity    Alcohol use: No    Drug use: No        Current Outpatient Medications on File Prior  to Visit   Medication Sig Dispense Refill    albuterol sulfate  (90 Base) MCG/ACT inhaler Inhale 2 puffs Every 4 (Four) Hours As Needed for Shortness of Air. 6.7 g 0    azelastine (ASTELIN) 0.1 % nasal spray 2 sprays into the nostril(s) as directed by provider 2 (Two) Times a Day. Use in each nostril as directed      DULoxetine (CYMBALTA) 30 MG capsule Take 2 capsules by mouth Daily.      furosemide (LASIX) 20 MG tablet Take 1 tablet by mouth Daily.      levocetirizine (XYZAL) 5 MG tablet Take 1 tablet by mouth Every Evening.      linaclotide (LINZESS) 290 MCG capsule capsule Take 1 capsule by mouth Every Morning Before Breakfast.      montelukast (SINGULAIR) 10 MG tablet Take 1 tablet by mouth Every Night.      ondansetron ODT (ZOFRAN-ODT) 4 MG disintegrating tablet Place 1 tablet on the tongue Every 8 (Eight) Hours As Needed for Nausea or Vomiting. 14 tablet 0    potassium chloride (K-DUR,KLOR-CON) 20 MEQ CR tablet Take 0.5 tablets by mouth 2 (Two) Times a Day.      SUMAtriptan (IMITREX) 25 MG tablet Take 1 tablet by mouth Every 2 (Two) Hours As Needed for Migraine. Take one tablet at onset of headache. May repeat dose one time in 2 hours if headache not relieved.      [DISCONTINUED] benzonatate (TESSALON) 200 MG capsule Take 1 capsule by mouth 3 (Three) Times a Day As Needed for Cough. 30 capsule 0    [DISCONTINUED] brompheniramine-pseudoephedrine-DM 30-2-10 MG/5ML syrup Take 5 mL by mouth 4 (Four) Times a Day As Needed for Congestion or Cough. 473 mL 0    [DISCONTINUED] diclofenac (VOLTAREN) 75 MG EC tablet Take 1 tablet by mouth 2 (Two) Times a Day As Needed (pain). 20 tablet 0    [DISCONTINUED] gabapentin (NEURONTIN) 600 MG tablet Take 1 tablet by mouth 3 (Three) Times a Day.      [DISCONTINUED] olopatadine (PATANOL) 0.1 % ophthalmic solution Administer 1 drop to both eyes 2 (Two) Times a Day.      [DISCONTINUED] ondansetron ODT (ZOFRAN-ODT) 4 MG disintegrating tablet Take 4 mg by mouth Every 8 (Eight)  "Hours As Needed for Nausea or Vomiting.      [DISCONTINUED] pantoprazole (PROTONIX) 40 MG EC tablet Take 1 tablet by mouth Daily. 14 tablet 0    [DISCONTINUED] riFAXIMin (XIFAXAN) 550 MG tablet Take 550 mg by mouth Every 8 (Eight) Hours.      [DISCONTINUED] zafirlukast (ACCOLATE) 20 MG tablet Take 20 mg by mouth 2 (Two) Times a Day.       No current facility-administered medications on file prior to visit.       DAXROS    Objective   Pulse 71   Ht 160 cm (63\")   Wt 74.4 kg (164 lb)   SpO2 97%   BMI 29.05 kg/m²     Foot/Ankle Exam    GENERAL  Appearance:  appears stated age  Orientation:  AAOx3  Affect:  appropriate  Gait:  unimpaired  Assistance:  independent    VASCULAR     Left Foot Vascularity   Normal vascular exam    Dorsalis pedis:  2+  Posterior tibial:  2+  Skin temperature:  warm  Edema grading:  None  CFT:  < 3 seconds  Pedal hair growth:  Present  Varicosities:  none     NEUROLOGIC     Left Foot Neurologic   Light touch sensation: normal    MUSCULOSKELETAL     Left Foot Musculoskeletal   Ecchymosis:  none  Tenderness:  none    MUSCLE STRENGTH     Left Foot Muscle Strength   Foot dorsiflexion:  5  Foot plantar flexion:  5    DERMATOLOGIC      Right Foot Dermatologic   Nails  1.  Absent.     Left Foot Dermatologic   Skin  Positive for skin changes.   Nails  1.  Absent.  2.  Normal.  3.  Normal.  4.  Normal.  5.  Normal.     Left foot additional comments: Bilateral feet appears stable with no open wounds or signs of active acute infection.       Assessment & Plan   Diagnoses and all orders for this visit:    1. Nontraumatic avulsion of toenail (Primary)    2. Onychodystrophy      The patient's left great toenail has improved since her last visit with Dr. Tucker in 08/2023. We discussed I do not see any toenail to her left great toe today. I would like to give her toenail a chance to grow back to see what is going to happen as it grows back. We discussed if the patient experiences pain or infection, then " I would consider proceeding with toenail evulsion and application of phenol. If she starts to experience draining, erythema, or pain, she will do Epsom salt soaks every day for 1 week.    The patient will follow up with  TEQUILA Crews,  in 2 months for toenail concerns.  She will call if she has any concerns.    No orders of the defined types were placed in this encounter.         Transcribed from ambient dictation for TEQUILA Crews by Nataly Gallagher.  09/12/23   11:39 EDT    Patient or patient representative verbalized consent to the visit recording.  I have personally performed the services described in this document as transcribed by the above individual, and it is both accurate and complete.  TEQUILA Crews  9/12/2023  12:10 EDT

## 2023-11-14 ENCOUNTER — OFFICE VISIT (OUTPATIENT)
Dept: PODIATRY | Facility: CLINIC | Age: 53
End: 2023-11-14
Payer: MEDICARE

## 2023-11-14 VITALS — RESPIRATION RATE: 20 BRPM | WEIGHT: 164 LBS | BODY MASS INDEX: 29.06 KG/M2 | HEIGHT: 63 IN

## 2023-11-14 DIAGNOSIS — L60.0 INGROWN TOENAIL: ICD-10-CM

## 2023-11-14 DIAGNOSIS — M79.675 GREAT TOE PAIN, LEFT: ICD-10-CM

## 2023-11-14 DIAGNOSIS — L60.3 ONYCHODYSTROPHY: Primary | ICD-10-CM

## 2023-11-14 RX ORDER — DEXTROAMPHETAMINE SACCHARATE, AMPHETAMINE ASPARTATE, DEXTROAMPHETAMINE SULFATE AND AMPHETAMINE SULFATE 2.5; 2.5; 2.5; 2.5 MG/1; MG/1; MG/1; MG/1
TABLET ORAL
COMMUNITY

## 2023-11-14 RX ORDER — POTASSIUM CHLORIDE 750 MG/1
20 TABLET, FILM COATED, EXTENDED RELEASE ORAL DAILY
COMMUNITY
Start: 2023-10-23

## 2023-11-14 RX ORDER — FLUTICASONE PROPIONATE 250 UG/1
POWDER, METERED RESPIRATORY (INHALATION)
COMMUNITY

## 2023-11-14 RX ORDER — OMEPRAZOLE 40 MG/1
40 CAPSULE, DELAYED RELEASE ORAL EVERY MORNING
COMMUNITY
Start: 2023-09-10

## 2023-11-14 RX ORDER — EPINEPHRINE 0.3 MG/.3ML
INJECTION SUBCUTANEOUS
COMMUNITY
Start: 2023-11-09

## 2023-11-14 RX ORDER — VALSARTAN 160 MG/1
160 TABLET ORAL DAILY
COMMUNITY
Start: 2023-09-19

## 2023-11-14 NOTE — PATIENT INSTRUCTIONS
Post Nail Removal Instructions  Keep today's bandage on until tomorrow. There should be minimal bleeding after the procedure; however, if there is oozing or dripping blood, elevate your foot and contact the office at [phone].    Starting day 2: Remove the dressing and wash the toe well with warm soapy water. You may shower as needed then dry the area thoroughly and dress wound with antibiotic ointment and a Band-aid. Soak the foot in warm soapy water or water with Epsom salt for 20 minutes twice daily as needed. Continue this process for the first week.    After the first week: You can discontinue the antibiotic ointment and keep the wound covered with a Band-aid during the day, but leave open to air at night. Soaks can continue if desired.    If an antibiotic prescription was dispensed, please fill as soon as possible and take as instructed.    Pain and discomfort are typically mild after this procedure which does not require the use of narcotic pain medication. If you do experience discomfort, it is recommended that  you try over the counter pain relievers such as ibuprofen or Tylenol.     It is very common to notice mild redness and clear drainage after the procedure, but if increased pain, swelling, redness, or significant discharge is noticed, call the office to notify your provider.

## 2023-11-14 NOTE — PROGRESS NOTES
11/14/2023  Foot and Ankle Surgery - Established Patient/Follow-up  Provider: TEQUILA Hoffman   Location: [facility]    Subjective:  Promise Miles is a 53 y.o. female.     Chief Complaint   Patient presents with    Left Foot - Follow-up     Infection great toe     Follow-up     RADHA Kimberly APRN 08/2023       HPI: The patient presents today for left great toe infection.     The patient was last seen in 09/2023. She reports that when she wears closed toed shoes that the toe becomes red and pus is present. She does not have a nail on this toe as it fell off. She previously was in the hospital in 07/2023 for her right great toe and she seen Dr. Tucker. As the right toe was healing, that is when symptoms with her left great toe onset. The patient states that symptoms for both toes started after she received a pedicure.     She is diabetic. Her blood sugars are normal at this time.    Allergies   Allergen Reactions    Pollen Extract Hives, Itching, Rash, Shortness Of Breath and Swelling    Tolu Grass Pollen Allergen Itching, Rash and Shortness Of Breath    Hydromorphone Hives     Pruritic rash    Hyoscyamine Itching    Hyoscyamine Sulfate Itching and Nausea And Vomiting    Oxycodone Itching     Itching rash    Adhesive Tape Rash     Peels skin    Beef Allergy Hives, Nausea Only, Rash and Swelling    Celery Hives, Itching, Rash and Swelling    Chicken Allergy Itching, Nausea Only, Rash and Photosensitivity    Codeine Itching, Nausea Only and Rash    Dust Mite Extract Hives, Itching, Rash and Swelling    Food Hives, Itching, Rash and Swelling     HAS FOOD ALLERGIES    Jacquie Hives, Itching, Rash and Swelling    Hydrocodone Itching and Rash    Morphine Hives, Itching, Nausea Only, Rash and Swelling    Nuts Hives, Itching, Rash and Swelling    Other Hives, Itching, Nausea Only, Rash and Swelling     Potatoes, greens beans, and tuna fish, celery    Tuna Flavor Hives, Itching, Rash and Swelling    Turkey  Hives, Itching, Rash and Swelling    Vancomycin Hives, Itching, Nausea And Vomiting, Rash and Swelling    Vanilla Hives, Rash and Swelling       Current Outpatient Medications on File Prior to Visit   Medication Sig Dispense Refill    albuterol sulfate  (90 Base) MCG/ACT inhaler Inhale 2 puffs Every 4 (Four) Hours As Needed for Shortness of Air. 6.7 g 0    amphetamine-dextroamphetamine (ADDERALL) 10 MG tablet TAKE 1 TABLET BY MOUTH DAILY. MAX DAILY AMOUNT: 10 MG.      azelastine (ASTELIN) 0.1 % nasal spray 2 sprays into the nostril(s) as directed by provider 2 (Two) Times a Day. Use in each nostril as directed      DULoxetine (CYMBALTA) 30 MG capsule Take 2 capsules by mouth Daily.      EPINEPHrine (EPIPEN) 0.3 MG/0.3ML solution auto-injector injection       Flovent Diskus 250 MCG/ACT aerosol powder  INHALE 1 PUFF INTO THE LUNGS TWICE A DAY      furosemide (LASIX) 20 MG tablet Take 1 tablet by mouth Daily.      levocetirizine (XYZAL) 5 MG tablet Take 1 tablet by mouth Every Evening.      linaclotide (LINZESS) 290 MCG capsule capsule Take 1 capsule by mouth Every Morning Before Breakfast.      montelukast (SINGULAIR) 10 MG tablet Take 1 tablet by mouth Every Night.      mupirocin (BACTROBAN) 2 % ointment APPLY TOPICALLY TO AFFECTED AREA 3 TIMES A DAY      omeprazole (priLOSEC) 40 MG capsule Take 1 capsule by mouth Every Morning.      ondansetron ODT (ZOFRAN-ODT) 4 MG disintegrating tablet Place 1 tablet on the tongue Every 8 (Eight) Hours As Needed for Nausea or Vomiting. 14 tablet 0    potassium chloride (K-DUR,KLOR-CON) 20 MEQ CR tablet Take 0.5 tablets by mouth 2 (Two) Times a Day.      potassium chloride 10 MEQ CR tablet Take 2 tablets by mouth Daily.      SUMAtriptan (IMITREX) 25 MG tablet Take 1 tablet by mouth Every 2 (Two) Hours As Needed for Migraine. Take one tablet at onset of headache. May repeat dose one time in 2 hours if headache not relieved.      valsartan (DIOVAN) 160 MG tablet Take 1 tablet  "by mouth Daily.       No current facility-administered medications on file prior to visit.       Objective   Resp 20   Ht 160 cm (63\")   Wt 74.4 kg (164 lb)   BMI 29.05 kg/m²     Foot/Ankle Exam    GENERAL  Appearance:  appears stated age  Orientation:  AAOx3  Affect:  appropriate  Gait:  unimpaired  Assistance:  independent  Right shoe gear: casual shoe and sock  Left shoe gear: casual shoe and sock    VASCULAR     Right Foot Vascularity   Dorsalis pedis:  1+  Edema grading:  None  CFT:  < 3 seconds  Pedal hair growth:  Present  Varicosities:  none     Left Foot Vascularity   Dorsalis pedis:  1+  Edema gradin+  CFT:  < 3 seconds  Pedal hair growth:  Present  Varicosities:  none     NEUROLOGIC     Right Foot Neurologic   Light touch sensation: normal     Left Foot Neurologic   Light touch sensation: normal    MUSCULOSKELETAL     Right Foot Musculoskeletal   Ecchymosis:  none  Tenderness:  none       Left Foot Musculoskeletal   Ecchymosis:  none  Tenderness:  toe 1 tenderness and toenail problem    DERMATOLOGIC      Right Foot Dermatologic   Skin  Right foot skin is intact.   Nails  1.  Absent. Positive for dystrophic nail.     Left Foot Dermatologic   Skin  Positive for erythema.   Nails  1.  Positive for dystrophic nail and ingrown toenail.      GENERAL  Appearance:  appears stated age  Orientation:  AAOx3  Affect:  appropriate  Gait:  unimpaired  Assistance:  independent     VASCULAR      Left Foot Vascularity   Normal vascular exam    Dorsalis pedis:  2+  Posterior tibial:  2+  Skin temperature:  warm  Edema grading:  None  CFT:  < 3 seconds  Pedal hair growth:  Present  Varicosities:  none     NEUROLOGIC      Left Foot Neurologic   Light touch sensation: normal     MUSCULOSKELETAL      Left Foot Musculoskeletal   Ecchymosis:  none  Tenderness:  none     MUSCLE STRENGTH      Left Foot Muscle Strength   Foot dorsiflexion:  5  Foot plantar flexion:  5     DERMATOLOGIC       Right Foot Dermatologic "   Nails  1.  Absent.      Left Foot Dermatologic   Skin  Positive for skin changes.   Nails  1.  Absent.  2.  Normal.  3.  Normal.  4.  Normal.  5.  Normal.     Left foot additional comments: Bilateral feet appears stable with no open wounds or signs of active acute infection.         Assessment & Plan   Diagnoses and all orders for this visit:    1. Onychodystrophy (Primary)    2. Great toe pain, left    3. Ingrown toenail        Total Nail Avulsion: Left hallux    Consent and time out was performed before proceeding with the procedure.  Left hallux was cleansed with alcohol and the digit was blocked in a ring block fashion utilizing 5 mL of 1% lidocaine plain.  A digital tourniquet was applied to the digit.  The nail was lifted from the nail bed and nail margins with a [place]. The offending nail margins were grasped with a hemostat and removed.  The nail borders were explored with a curette to ensure adequate removal.  The nail fold and exposed nail bed were flushed with normal saline.  Antibiotic ointment followed by sterile compressive dressings were then applied.  The digital tourniquet was removed.  No excessive bleeding or complications were evident.  The patient tolerated procedure and local anesthetic well.      The patient presents today for a left great toe infection. Discussed the patients symptoms. Informed her that we will do a nail removal in office today. Advised that she will have a scab for approximately 8 weeks along with drainage and redness for a couple of weeks. If she continues to have problems we will repeat the procedure with the addition of acid to the nail. Advised that she can care for it for about a week with Epsom salt soaks and antibiotic ointment applied with a Band-Aid. She can utilize Tylenol and Ibuprofen or any discomfort. I do not have any restrictions for the patient.       No orders of the defined types were placed in this encounter.         Note is dictated utilizing voice  recognition software. Unfortunately this leads to occasional typographical errors. I apologize in advance if the situation occurs. If questions occur please do not hesitate to call our office.       Transcribed from ambient dictation for TEQUILA Crews by Jennifer Sher.  11/14/23   14:17 EST    Patient or patient representative verbalized consent to the visit recording.  I have personally performed the services described in this document as transcribed by the above individual, and it is both accurate and complete.  TEQUILA Crews  11/14/2023  17:20 EST

## 2024-01-16 ENCOUNTER — OFFICE VISIT (OUTPATIENT)
Dept: PODIATRY | Facility: CLINIC | Age: 54
End: 2024-01-16
Payer: MEDICARE

## 2024-01-16 VITALS — BODY MASS INDEX: 29.06 KG/M2 | HEIGHT: 63 IN | RESPIRATION RATE: 20 BRPM | WEIGHT: 164 LBS

## 2024-01-16 DIAGNOSIS — L60.3 ONYCHODYSTROPHY: ICD-10-CM

## 2024-01-16 DIAGNOSIS — M79.671 ACUTE FOOT PAIN, RIGHT: Primary | ICD-10-CM

## 2024-01-16 DIAGNOSIS — L85.3 XEROSIS CUTIS: ICD-10-CM

## 2024-01-16 DIAGNOSIS — S91.209A AVULSION OF TOENAIL, INITIAL ENCOUNTER: ICD-10-CM

## 2024-01-16 RX ORDER — CEFDINIR 300 MG/1
CAPSULE ORAL
COMMUNITY
Start: 2024-01-08

## 2024-01-16 RX ORDER — PROMETHAZINE HYDROCHLORIDE 25 MG/1
25 TABLET ORAL
COMMUNITY
Start: 2024-01-04

## 2024-01-16 RX ORDER — MUPIROCIN CALCIUM 20 MG/G
1 CREAM TOPICAL DAILY
Qty: 30 G | Refills: 2 | Status: SHIPPED | OUTPATIENT
Start: 2024-01-16 | End: 2024-01-17

## 2024-01-16 RX ORDER — LORATADINE 10 MG/1
1 TABLET ORAL DAILY
COMMUNITY
Start: 2023-11-26

## 2024-01-16 RX ORDER — ONDANSETRON 4 MG/1
TABLET, FILM COATED ORAL
COMMUNITY
Start: 2023-11-30

## 2024-01-16 NOTE — PROGRESS NOTES
"01/16/2024  Foot and Ankle Surgery - Established Patient/Follow-up  Provider: ERICKA Hoffman   Location: [facility]    Subjective:  Promise Miles is a 53 y.o. female.     Chief Complaint   Patient presents with    Right Foot - Pain, Nail Problem, Wound Check, Foot Swelling, Peripheral Neuropathy    Left Foot - Wound Check    Follow-up     FABIANO Tavarez ericka 8/1/2023       The patient is here today for follow-up.    She reports she injured her right foot 3 weeks ago. She states she planned to wear these boots for 2 to 3 hours and ended up wearing them for 36 hours. Her \"little toes\" on her right foot have gotten worse. She adds the skin on her foot is peeling and part of her fifth toenail has come off. The patient reports overall pain in her foot and has difficulty wearing shoes. She has been utilizing [employer]es and applying lotion to her skin. She affirms having a surgical boot at home. The patient adds the toenail that was removed from her left foot is doing well.    The patient states she did a home pedicure and noticed blood on her foot when she put her sock on afterwards. She explains she did not feel this and worries about loss of sensation and diabetes. She also notes feeling \"numb and tingly pins and needles.\" The patient will be seeing her primary care provider in three weeks. She states she had a hemoglobin A1c of 6.3%. Her blood glucose levels do not run high.    Allergies   Allergen Reactions    Pollen Extract Hives, Itching, Rash, Shortness Of Breath and Swelling    Tolu Grass Pollen Allergen Itching, Rash and Shortness Of Breath    Hydromorphone Hives     Pruritic rash    Hyoscyamine Itching    Hyoscyamine Sulfate Itching and Nausea And Vomiting    Oxycodone Itching     Itching rash    Adhesive Tape Rash     Peels skin    Beef Allergy Hives, Nausea Only, Rash and Swelling    Celery Hives, Itching, Rash and Swelling    Chicken Allergy Itching, Nausea Only, Rash and Photosensitivity    " Codeine Itching, Nausea Only and Rash    Dust Mite Extract Hives, Itching, Rash and Swelling    Food Hives, Itching, Rash and Swelling     HAS FOOD ALLERGIES    Ginger Hives, Itching, Rash and Swelling    Hydrocodone Itching and Rash    Morphine Hives, Itching, Nausea Only, Rash and Swelling    Nuts Hives, Itching, Rash and Swelling    Other Hives, Itching, Nausea Only, Rash and Swelling     Potatoes, greens beans, and tuna fish, celery    Tuna Flavor Hives, Itching, Rash and Swelling    Turkey Hives, Itching, Rash and Swelling    Vancomycin Hives, Itching, Nausea And Vomiting, Rash and Swelling    Vanilla Hives, Rash and Swelling       Current Outpatient Medications on File Prior to Visit   Medication Sig Dispense Refill    albuterol sulfate  (90 Base) MCG/ACT inhaler Inhale 2 puffs Every 4 (Four) Hours As Needed for Shortness of Air. 6.7 g 0    amphetamine-dextroamphetamine (ADDERALL) 10 MG tablet TAKE 1 TABLET BY MOUTH DAILY. MAX DAILY AMOUNT: 10 MG.      azelastine (ASTELIN) 0.1 % nasal spray 2 sprays into the nostril(s) as directed by provider 2 (Two) Times a Day. Use in each nostril as directed      Diclofenac Sodium (VOLTAREN) 1 % gel gel Apply 4 g topically to the appropriate area as directed 4 (Four) Times a Day.      DULoxetine (CYMBALTA) 30 MG capsule Take 2 capsules by mouth Daily.      EPINEPHrine (EPIPEN) 0.3 MG/0.3ML solution auto-injector injection       Flovent Diskus 250 MCG/ACT aerosol powder  INHALE 1 PUFF INTO THE LUNGS TWICE A DAY      furosemide (LASIX) 20 MG tablet Take 1 tablet by mouth Daily.      levocetirizine (XYZAL) 5 MG tablet Take 1 tablet by mouth Every Evening.      linaclotide (LINZESS) 290 MCG capsule capsule Take 1 capsule by mouth Every Morning Before Breakfast.      loratadine (CLARITIN) 10 MG tablet Take 1 tablet by mouth Daily.      montelukast (SINGULAIR) 10 MG tablet Take 1 tablet by mouth Every Night.      mupirocin (BACTROBAN) 2 % ointment APPLY TOPICALLY TO  "AFFECTED AREA 3 TIMES A DAY      omeprazole (priLOSEC) 40 MG capsule Take 1 capsule by mouth Every Morning.      ondansetron (ZOFRAN) 4 MG tablet TAKE 1 TABLET BY MOUTH EVERY 8 HOURS AS NEEDED FOR NAUSEA FOR UP TO 7 DAYS.      ondansetron ODT (ZOFRAN-ODT) 4 MG disintegrating tablet Place 1 tablet on the tongue Every 8 (Eight) Hours As Needed for Nausea or Vomiting. 14 tablet 0    potassium chloride (K-DUR,KLOR-CON) 20 MEQ CR tablet Take 0.5 tablets by mouth 2 (Two) Times a Day.      potassium chloride 10 MEQ CR tablet Take 2 tablets by mouth Daily.      promethazine (PHENERGAN) 25 MG tablet Take 1 tablet by mouth.      SUMAtriptan (IMITREX) 25 MG tablet Take 1 tablet by mouth Every 2 (Two) Hours As Needed for Migraine. Take one tablet at onset of headache. May repeat dose one time in 2 hours if headache not relieved.      valsartan (DIOVAN) 160 MG tablet Take 1 tablet by mouth Daily.      cefdinir (OMNICEF) 300 MG capsule  (Patient not taking: Reported on 2024)       No current facility-administered medications on file prior to visit.       Objective   Resp 20   Ht 160 cm (63\")   Wt 74.4 kg (164 lb)   BMI 29.05 kg/m²     Foot/Ankle Exam    GENERAL  Appearance:  appears stated age  Orientation:  AAOx3  Affect:  appropriate  Gait:  unimpaired  Assistance:  independent  Right shoe gear: casual shoe and sock  Left shoe gear: casual shoe and sock    VASCULAR     Right Foot Vascularity   Dorsalis pedis:  1+  Edema grading:  None  CFT:  < 3 seconds  Pedal hair growth:  Present  Varicosities:  none     Left Foot Vascularity   Dorsalis pedis:  1+  Edema gradin+  CFT:  < 3 seconds  Pedal hair growth:  Present  Varicosities:  none     NEUROLOGIC     Right Foot Neurologic   Light touch sensation: normal  Protective Sensation using Akeley-Willard Monofilament:   Sites intact: 9  Sites tested: 10     Left Foot Neurologic   Light touch sensation: normal  Protective Sensation using Akeley-Willard Monofilament: "   Sites intact: 10  Sites tested: 10    MUSCULOSKELETAL     Right Foot Musculoskeletal   Ecchymosis:  none  Tenderness:  none       Left Foot Musculoskeletal   Ecchymosis:  none  Tenderness:  toe 1 tenderness and toenail problem    DERMATOLOGIC      Right Foot Dermatologic   Skin  Right foot skin is intact.   Nails  1.  Absent. Positive for dystrophic nail.     Left Foot Dermatologic   Skin  Positive for erythema.   Nails  1.  Positive for dystrophic nail and ingrown toenail.     Right foot additional comments: 01/16/2024: Wound to right fifth toenail bed.     Left foot additional comments: 01/16/2024: Dry skin to left heel      Assessment & Plan   Diagnoses and all orders for this visit:    1. Acute foot pain, right (Primary)  -     XR Foot 3+ View Right    2. Onychodystrophy    3. Avulsion of toenail, initial encounter    4. Xerosis cutis    Other orders  -     mupirocin (BACTROBAN) 2 % cream; Apply 1 application  topically to the appropriate area as directed Daily.  Dispense: 30 g; Refill: 2      Small wound to right fifth toenail bed.  There are no overt signs of infection. I will recommend mupirocin and Band-Aid change daily. The patient is to wear open toed more comfortable shoes. She may also try wearing a surgical shoe. I encouraged Epsom salt soaks daily for about a week.  X-ray of the right foot was obtained with no obvious osseous acute findings noted.  Discussed findings with patient.  Patient has mild dry skin and cracking of left heel again no signs of infection noted no drainage noted.  Recommend that she apply moisturizer daily and will be given list of over-the-counter appropriate moisturizers.  We did discuss wearing appropriate footwear to prevent reoccurring problems with her toenails.  Patient verbalized understanding and agrees.  Patient will follow-up in 2 weeks for reevaluation.    Orders Placed This Encounter   Procedures    XR Foot 3+ View Right     Order Specific Question:   Reason for  Exam:     Answer:   3rd 5th toe wound, foot swelling   room 8  wb     Order Specific Question:   Does this patient have a diabetic monitoring/medication delivering device on?     Answer:   No     Order Specific Question:   Release to patient     Answer:   Routine Release [[id number]]        Transcribed from ambient dictation for TEQUILA Crews by Monse Mota.  01/16/24   11:53 EST    Patient or patient representative verbalized consent to the visit recording.  I have personally performed the services described in this document as transcribed by the above individual, and it is both accurate and complete.  TEQUILA Crews  1/16/2024  15:18 EST

## 2024-01-16 NOTE — PATIENT INSTRUCTIONS
Recommended Over-The-Counter Foot Creams/Balms for Dry, Cracked Feet:    Urea 40% Cream      2. [employer]'s Healthy Feet Foot Cream    3. AmLactin Foot Repair      4. Eucerin Advanced Repair Cream      5. CeraVe Renewing SA Foot Cream      6. Bag [place] Hand & Body Ointment

## 2024-04-20 ENCOUNTER — APPOINTMENT (OUTPATIENT)
Dept: CT IMAGING | Facility: HOSPITAL | Age: 54
End: 2024-04-20
Payer: MEDICARE

## 2024-04-20 ENCOUNTER — HOSPITAL ENCOUNTER (EMERGENCY)
Facility: HOSPITAL | Age: 54
Discharge: HOME | End: 2024-04-20
Attending: EMERGENCY MEDICINE
Payer: MEDICARE

## 2024-04-20 VITALS
RESPIRATION RATE: 17 BRPM | DIASTOLIC BLOOD PRESSURE: 72 MMHG | OXYGEN SATURATION: 98 % | WEIGHT: 170.19 LBS | TEMPERATURE: 98.4 F | BODY MASS INDEX: 30.16 KG/M2 | HEART RATE: 91 BPM | SYSTOLIC BLOOD PRESSURE: 120 MMHG | HEIGHT: 63 IN

## 2024-04-20 DIAGNOSIS — R10.32 LEFT LOWER QUADRANT ABDOMINAL PAIN: Primary | ICD-10-CM

## 2024-04-20 DIAGNOSIS — R11.2 NAUSEA AND VOMITING, UNSPECIFIED VOMITING TYPE: ICD-10-CM

## 2024-04-20 DIAGNOSIS — K52.9 COLITIS: ICD-10-CM

## 2024-04-20 LAB
ANION GAP SERPL CALCULATED.3IONS-SCNC: 14 MMOL/L (ref 5–15)
BASOPHILS # BLD AUTO: 0.03 10*3/MM3 (ref 0–0.2)
BASOPHILS NFR BLD AUTO: 0.3 % (ref 0–1.5)
BILIRUB UR QL STRIP: NEGATIVE
BUN SERPL-MCNC: 18 MG/DL (ref 6–20)
BUN/CREAT SERPL: 20.5 (ref 7–25)
CALCIUM SPEC-SCNC: 9.7 MG/DL (ref 8.6–10.5)
CHLORIDE SERPL-SCNC: 108 MMOL/L (ref 98–107)
CLARITY UR: ABNORMAL
CO2 SERPL-SCNC: 24 MMOL/L (ref 22–29)
COLOR UR: YELLOW
CREAT SERPL-MCNC: 0.88 MG/DL (ref 0.57–1)
DEPRECATED RDW RBC AUTO: 45 FL (ref 37–54)
EGFRCR SERPLBLD CKD-EPI 2021: 77.7 ML/MIN/1.73
EOSINOPHIL # BLD AUTO: 0.08 10*3/MM3 (ref 0–0.4)
EOSINOPHIL NFR BLD AUTO: 0.7 % (ref 0.3–6.2)
ERYTHROCYTE [DISTWIDTH] IN BLOOD BY AUTOMATED COUNT: 12.6 % (ref 12.3–15.4)
GLUCOSE SERPL-MCNC: 168 MG/DL (ref 65–99)
GLUCOSE UR STRIP-MCNC: NEGATIVE MG/DL
HCT VFR BLD AUTO: 44.3 % (ref 34–46.6)
HGB BLD-MCNC: 14.4 G/DL (ref 12–15.9)
HGB UR QL STRIP.AUTO: NEGATIVE
IMM GRANULOCYTES # BLD AUTO: 0.03 10*3/MM3 (ref 0–0.05)
IMM GRANULOCYTES NFR BLD AUTO: 0.3 % (ref 0–0.5)
KETONES UR QL STRIP: NEGATIVE
LEUKOCYTE ESTERASE UR QL STRIP.AUTO: NEGATIVE
LYMPHOCYTES # BLD AUTO: 1.01 10*3/MM3 (ref 0.7–3.1)
LYMPHOCYTES NFR BLD AUTO: 9.4 % (ref 19.6–45.3)
MCH RBC QN AUTO: 31.8 PG (ref 26.6–33)
MCHC RBC AUTO-ENTMCNC: 32.5 G/DL (ref 31.5–35.7)
MCV RBC AUTO: 97.8 FL (ref 79–97)
MONOCYTES # BLD AUTO: 0.66 10*3/MM3 (ref 0.1–0.9)
MONOCYTES NFR BLD AUTO: 6.1 % (ref 5–12)
NEUTROPHILS NFR BLD AUTO: 8.98 10*3/MM3 (ref 1.7–7)
NEUTROPHILS NFR BLD AUTO: 83.2 % (ref 42.7–76)
NITRITE UR QL STRIP: NEGATIVE
NRBC BLD AUTO-RTO: 0 /100 WBC (ref 0–0.2)
PH UR STRIP.AUTO: 8.5 [PH] (ref 5–8)
PLATELET # BLD AUTO: 191 10*3/MM3 (ref 140–450)
PMV BLD AUTO: 10.7 FL (ref 6–12)
POTASSIUM SERPL-SCNC: 3.7 MMOL/L (ref 3.5–5.2)
PROT UR QL STRIP: NEGATIVE
RBC # BLD AUTO: 4.53 10*6/MM3 (ref 3.77–5.28)
SODIUM SERPL-SCNC: 146 MMOL/L (ref 136–145)
SP GR UR STRIP: 1.01 (ref 1–1.03)
UROBILINOGEN UR QL STRIP: ABNORMAL
WBC NRBC COR # BLD AUTO: 10.79 10*3/MM3 (ref 3.4–10.8)

## 2024-04-20 PROCEDURE — 85025 COMPLETE CBC W/AUTO DIFF WBC: CPT | Performed by: NURSE PRACTITIONER

## 2024-04-20 PROCEDURE — 25010000002 KETOROLAC TROMETHAMINE PER 15 MG: Performed by: NURSE PRACTITIONER

## 2024-04-20 PROCEDURE — 74176 CT ABD & PELVIS W/O CONTRAST: CPT

## 2024-04-20 PROCEDURE — 96374 THER/PROPH/DIAG INJ IV PUSH: CPT

## 2024-04-20 PROCEDURE — 99284 EMERGENCY DEPT VISIT MOD MDM: CPT

## 2024-04-20 PROCEDURE — 81003 URINALYSIS AUTO W/O SCOPE: CPT

## 2024-04-20 PROCEDURE — 80048 BASIC METABOLIC PNL TOTAL CA: CPT | Performed by: NURSE PRACTITIONER

## 2024-04-20 RX ORDER — KETOROLAC TROMETHAMINE 30 MG/ML
15 INJECTION, SOLUTION INTRAMUSCULAR; INTRAVENOUS ONCE
Status: COMPLETED | OUTPATIENT
Start: 2024-04-20 | End: 2024-04-20

## 2024-04-20 RX ORDER — ONDANSETRON 4 MG/1
4 TABLET, ORALLY DISINTEGRATING ORAL EVERY 8 HOURS PRN
Qty: 10 TABLET | Refills: 0 | Status: SHIPPED | OUTPATIENT
Start: 2024-04-20

## 2024-04-20 RX ORDER — METRONIDAZOLE 500 MG/1
500 TABLET ORAL ONCE
Status: COMPLETED | OUTPATIENT
Start: 2024-04-20 | End: 2024-04-20

## 2024-04-20 RX ORDER — METRONIDAZOLE 500 MG/1
500 TABLET ORAL 3 TIMES DAILY
Qty: 21 TABLET | Refills: 0 | Status: SHIPPED | OUTPATIENT
Start: 2024-04-20 | End: 2024-04-27

## 2024-04-20 RX ORDER — SODIUM CHLORIDE 0.9 % (FLUSH) 0.9 %
10 SYRINGE (ML) INJECTION AS NEEDED
Status: DISCONTINUED | OUTPATIENT
Start: 2024-04-20 | End: 2024-04-20 | Stop reason: HOSPADM

## 2024-04-20 RX ADMIN — METRONIDAZOLE 500 MG: 500 TABLET ORAL at 03:59

## 2024-04-20 RX ADMIN — KETOROLAC TROMETHAMINE 15 MG: 30 INJECTION, SOLUTION INTRAMUSCULAR at 02:49

## 2024-04-20 NOTE — DISCHARGE INSTRUCTIONS
Take entire course of antibiotics  Return to ED for new or worsening symptoms  Follow-up with your PCP, call for an appointment

## 2024-04-20 NOTE — ED PROVIDER NOTES
Subjective   Chief Complaint   Patient presents with    Flank Pain       History of Present Illness  54-year-old female presents emergency department with a complaint of flank pain, abdominal pain.  She reports that she has been having difficulty urinating, nausea vomiting.  This began tonight.  No fevers.  She reports chills.  She states she feels as though she has had similar symptoms with ureteral stones in the past.  No diarrhea.  No recent travel.   Review of Systems   Constitutional:  Negative for chills and fever.   Respiratory:  Negative for shortness of breath.    Cardiovascular:  Negative for chest pain.   Gastrointestinal:  Positive for abdominal pain, nausea and vomiting. Negative for blood in stool, constipation and diarrhea.   Genitourinary:  Negative for dysuria.   Musculoskeletal:  Negative for back pain and neck pain.   Skin:  Negative for rash.   Neurological:  Negative for dizziness, syncope, weakness and light-headedness.       Past Medical History:   Diagnosis Date    Asthma     Callus     Colitis     Diabetes mellitus July 2023    Difficulty walking 8/2023    Fibromyalgia     Hypertension 2/2023    Ingrown toenail July 2023    Interstitial cystitis     Irritable bowel syndrome     Migraine     Neuropathy in diabetes 2023    Paronychia 8/2023    Mrsa and enterobacter cloacae       Allergies   Allergen Reactions    Pollen Extract Hives, Itching, Rash, Shortness Of Breath and Swelling    Tolu Grass Pollen Allergen Itching, Rash and Shortness Of Breath    Hydromorphone Hives     Pruritic rash    Hyoscyamine Itching    Hyoscyamine Sulfate Itching and Nausea And Vomiting    Oxycodone Itching     Itching rash    Adhesive Tape Rash     Peels skin    Beef Allergy Hives, Nausea Only, Rash and Swelling    Celery Hives, Itching, Rash and Swelling    Chicken Allergy Itching, Nausea Only, Rash and Photosensitivity    Codeine Itching, Nausea Only and Rash    Dust Mite Extract Hives, Itching, Rash and  Swelling    Food Hives, Itching, Rash and Swelling     HAS FOOD ALLERGIES    Jacquie Hives, Itching, Rash and Swelling    Hydrocodone Itching and Rash    Morphine Hives, Itching, Nausea Only, Rash and Swelling    Nuts Hives, Itching, Rash and Swelling    Other Hives, Itching, Nausea Only, Rash and Swelling     Potatoes, greens beans, and tuna fish, celery    Tuna Flavor Hives, Itching, Rash and Swelling    Turkey Hives, Itching, Rash and Swelling    Vancomycin Hives, Itching, Nausea And Vomiting, Rash and Swelling    Vanilla Hives, Rash and Swelling       Past Surgical History:   Procedure Laterality Date    CHOLECYSTECTOMY      EYE SURGERY      HEMORRHOIDECTOMY      HYSTERECTOMY      TOENAIL EXCISION  July 13, 2023       Family History   Problem Relation Age of Onset    Heart disease Mother     Osteoporosis Mother     Diabetes Maternal Grandmother     Cancer Brother        Social History     Socioeconomic History    Marital status:    Tobacco Use    Smoking status: Never   Vaping Use    Vaping status: Never Used   Substance and Sexual Activity    Alcohol use: No    Drug use: No    Sexual activity: Yes     Partners: Male     Birth control/protection: Post-menopausal           Objective   Physical Exam  Vitals and nursing note reviewed.   Constitutional:       Appearance: Normal appearance.   HENT:      Nose: Nose normal.      Mouth/Throat:      Mouth: Mucous membranes are moist.      Pharynx: Oropharynx is clear.   Eyes:      Extraocular Movements: Extraocular movements intact.      Conjunctiva/sclera: Conjunctivae normal.      Pupils: Pupils are equal, round, and reactive to light.   Cardiovascular:      Rate and Rhythm: Normal rate and regular rhythm.      Heart sounds: Normal heart sounds.   Pulmonary:      Effort: Pulmonary effort is normal.      Breath sounds: Normal breath sounds.   Abdominal:      General: Bowel sounds are normal.      Palpations: Abdomen is soft.      Tenderness: There is abdominal  "tenderness (Mild tenderness.). There is no guarding or rebound.      Comments: No peritoneal signs.  Pain does not appear out of proportion.   Musculoskeletal:         General: Normal range of motion.      Cervical back: Normal range of motion and neck supple.   Skin:     General: Skin is warm and dry.      Capillary Refill: Capillary refill takes less than 2 seconds.      Findings: No erythema or rash.   Neurological:      Mental Status: She is alert and oriented to person, place, and time.         Procedures           ED Course  ED Course as of 04/20/24 0412   Sat Apr 20, 2024   0350 Discussed test results with patient.  Reports she does better.  Reports he had colitis previously [LB]      ED Course User Index  [LB] Rosa Brown, APRN      /69   Pulse 93   Temp 98.4 °F (36.9 °C)   Resp 18   Ht 160 cm (63\")   Wt 77.2 kg (170 lb 3.1 oz)   SpO2 96%   BMI 30.15 kg/m²   Medications   sodium chloride 0.9 % flush 10 mL (has no administration in time range)   ketorolac (TORADOL) injection 15 mg (15 mg Intravenous Given 4/20/24 0249)   metroNIDAZOLE (FLAGYL) tablet 500 mg (500 mg Oral Given 4/20/24 0359)     CT Abdomen Pelvis Stone Protocol    Result Date: 4/20/2024  Impression: 1. Mild focal colitis of the proximal descending colon. Otherwise, no acute intra-abdominal or intrapelvic process. 2. Ancillary findings as described above. Electronically Signed: Shanna Brooks MD  4/20/2024 3:22 AM EDT  Workstation ID: [id number]   Lab Results (last 24 hours)       Procedure Component Value Units Date/Time    Urinalysis With Culture If Indicated - Urine, Clean Catch [[id number]]  (Abnormal) Collected: 04/20/24 0037    Specimen: Urine, Clean Catch Updated: 04/20/24 0059     Color, UA Yellow     Appearance, UA Hazy     pH, UA 8.5     Specific Gravity, UA 1.015     Glucose, UA Negative     Ketones, UA Negative     Bilirubin, UA Negative     Blood, UA Negative     Protein, UA Negative     Leuk Esterase, UA " Negative     Nitrite, UA Negative     Urobilinogen, UA 1.0 E.U./dL    Narrative:      In absence of clinical symptoms, the presence of pyuria, bacteria, and/or nitrites on the urinalysis result does not correlate with infection.  Urine microscopic not indicated.    CBC & Differential [[id number]]  (Abnormal) Collected: 04/20/24 0248    Specimen: Blood Updated: 04/20/24 0256    Narrative:      The following orders were created for panel order CBC & Differential.  Procedure                               Abnormality         Status                     ---------                               -----------         ------                     CBC Auto Differential[[id number]]        Abnormal            Final result                 Please view results for these tests on the individual orders.    Basic Metabolic Panel [[id number]]  (Abnormal) Collected: 04/20/24 0248    Specimen: Blood Updated: 04/20/24 0314     Glucose 168 mg/dL      BUN 18 mg/dL      Creatinine 0.88 mg/dL      Sodium 146 mmol/L      Potassium 3.7 mmol/L      Chloride 108 mmol/L      CO2 24.0 mmol/L      Calcium 9.7 mg/dL      BUN/Creatinine Ratio 20.5     Anion Gap 14.0 mmol/L      eGFR 77.7 mL/min/1.73     Narrative:      GFR Normal >60  Chronic Kidney Disease <60  Kidney Failure <15      CBC Auto Differential [[id number]]  (Abnormal) Collected: 04/20/24 0248    Specimen: Blood Updated: 04/20/24 0256     WBC 10.79 10*3/mm3      RBC 4.53 10*6/mm3      Hemoglobin 14.4 g/dL      Hematocrit 44.3 %      MCV 97.8 fL      MCH 31.8 pg      MCHC 32.5 g/dL      RDW 12.6 %      RDW-SD 45.0 fl      MPV 10.7 fL      Platelets 191 10*3/mm3      Neutrophil % 83.2 %      Lymphocyte % 9.4 %      Monocyte % 6.1 %      Eosinophil % 0.7 %      Basophil % 0.3 %      Immature Grans % 0.3 %      Neutrophils, Absolute 8.98 10*3/mm3      Lymphocytes, Absolute 1.01 10*3/mm3      Monocytes, Absolute 0.66 10*3/mm3      Eosinophils, Absolute 0.08 10*3/mm3      Basophils, Absolute 0.03  10*3/mm3      Immature Grans, Absolute 0.03 10*3/mm3      nRBC 0.0 /100 WBC                                                  Medical Decision Making  Problems Addressed:  Colitis: complicated acute illness or injury  Left lower quadrant abdominal pain: complicated acute illness or injury  Nausea and vomiting, unspecified vomiting type: complicated acute illness or injury    Amount and/or Complexity of Data Reviewed  Labs: ordered.  Radiology: ordered.    Risk  Prescription drug management.    Chart Review:   Imaging: CT Abdomen Pelvis Stone Protocol    Result Date: 4/20/2024  Impression: 1. Mild focal colitis of the proximal descending colon. Otherwise, no acute intra-abdominal or intrapelvic process. 2. Ancillary findings as described above. Electronically Signed: Shanna Brooks MD  4/20/2024 3:22 AM EDT  Workstation ID: [id number]     Pt was Placed on appropriate monitoring.  Differential diagnoses considered for patient presentation, this list is not all inclusive of diagnoses considered: Ureteral stone, diverticulitis, pyelonephritis.  Patient presents to the ED for the above complaint, underwent the above, exam and workup.  Normal white blood cell count.  BMP reviewed.  UA not infected.  Mild colitis noted on CT.  No evidence for ischemic colitis.  Will treat with Flagyl.  Patient did not tolerate oral fluids.  Agrees to discharge home  Disposition: I discussed my findings, plan of care, discharge instructions, the importance of follow up with their PCP/ and or specialist for repeat evaluation and to discuss any abnormal findings in labs or imaging that warrant further outpatient evaluation. We discussed that although a definitive diagnosis is not always found in the ED, it is believed emergent conditions have been ruled out, and patient is safe for discharge at this time.  We discussed return precautions for the emergency department.  Patient verbalizes understandings, and agrees with current plan of care.  Note  Disclaimer: At [facility], we believe that sharing information builds trust and better relationships. You are receiving this note because you recently visited [facility]. It is possible you will see health information before a provider has talked with you about it. This kind of information can be easy to misunderstand. To help you fully understand what it means for your health, we urge you to discuss this note with your provider  Note dictated utilizing Dragon Dictation.  Appropriate PPE worn during patient interactions.        Final diagnoses:   Left lower quadrant abdominal pain   Colitis   Nausea and vomiting, unspecified vomiting type       ED Disposition  ED Disposition       ED Disposition   Discharge    Condition   Stable    Comment   --               Anjali Harris, APRN  [address] [place] [id number] [phone]    Schedule an appointment as soon as possible for a visit       [facility] EMERGENCY DEPARTMENT  [address] [place] [id number] [phone]    As needed, If symptoms worsen         Medication List        New Prescriptions      metroNIDAZOLE 500 MG tablet  Commonly known as: FLAGYL  Take 1 tablet by mouth 3 (Three) Times a Day for 7 days.            Stop      ondansetron 4 MG tablet  Commonly known as: ZOFRAN               Where to Get Your Medications        These medications were sent to [facility]/pharmacy #[id number] - [place], [place] - [address] - [phone]  - [phone] FX  [address] [place] [address]      Phone: [phone]   metroNIDAZOLE 500 MG tablet  ondansetron ODT 4 MG disintegrating tablet            Rosa Brown, TEQUILA  04/20/24 0413

## 2024-06-09 ENCOUNTER — APPOINTMENT (OUTPATIENT)
Dept: GENERAL RADIOLOGY | Facility: HOSPITAL | Age: 54
End: 2024-06-09
Payer: MEDICARE

## 2024-06-09 ENCOUNTER — HOSPITAL ENCOUNTER (EMERGENCY)
Facility: HOSPITAL | Age: 54
Discharge: HOME | End: 2024-06-09
Attending: EMERGENCY MEDICINE | Admitting: EMERGENCY MEDICINE
Payer: MEDICARE

## 2024-06-09 VITALS
BODY MASS INDEX: 28.83 KG/M2 | HEART RATE: 79 BPM | OXYGEN SATURATION: 98 % | DIASTOLIC BLOOD PRESSURE: 79 MMHG | RESPIRATION RATE: 18 BRPM | TEMPERATURE: 98.3 F | WEIGHT: 168.87 LBS | HEIGHT: 64 IN | SYSTOLIC BLOOD PRESSURE: 129 MMHG

## 2024-06-09 DIAGNOSIS — L03.032 CELLULITIS OF TOE OF LEFT FOOT: Primary | ICD-10-CM

## 2024-06-09 LAB — GLUCOSE BLDC GLUCOMTR-MCNC: 129 MG/DL (ref 70–105)

## 2024-06-09 PROCEDURE — 82948 REAGENT STRIP/BLOOD GLUCOSE: CPT | Performed by: EMERGENCY MEDICINE

## 2024-06-09 PROCEDURE — 73660 X-RAY EXAM OF TOE(S): CPT

## 2024-06-09 PROCEDURE — 99283 EMERGENCY DEPT VISIT LOW MDM: CPT

## 2024-06-09 RX ORDER — DOXYCYCLINE HYCLATE 100 MG/1
100 CAPSULE ORAL 2 TIMES DAILY
Qty: 14 CAPSULE | Refills: 0 | Status: SHIPPED | OUTPATIENT
Start: 2024-06-09

## 2024-06-09 NOTE — DISCHARGE INSTRUCTIONS
Follow-up with your foot doctor call tomorrow and set up an appointment within the next 1 to 2 days.  Keep area clean with soap and water.  Doxycycline sent to your pharmacy take this as directed.  Return for increasing redness swelling fever red streaks uncontrolled pain vomiting altered mental status red hot swollen foot leg or any other new or worsening problems or concerns return immediately to the ER.

## 2024-06-09 NOTE — ED NOTES
Pt reports for past 4 days her left great toe has had some bleeding, redness, swelling and pain. Pt denies any injury but states she thinks it is where her shoes and socks rub her toe. Pt has h/o diabetes.

## 2024-06-09 NOTE — ED PROVIDER NOTES
Subjective   History of Present Illness  Chief complaint possible infection to left toe    History of present illness 54-year-old female history of diabetes complains of 4-day history of some redness and irritation around the left toe.  She denies any fever or chills.  Unable to get into the foot doctor until next week.  No injury.  She really has no significant pain with it but it is irritated and is concerned about infection.  She states she has had to have the toenail removed in the past because of that.      Review of Systems   Constitutional:  Negative for chills and fever.   Gastrointestinal:  Negative for vomiting.   Skin:  Positive for wound. Negative for rash.       Past Medical History:   Diagnosis Date    Asthma     Callus     Colitis     Diabetes mellitus July 2023    Difficulty walking 8/2023    Fibromyalgia     Hypertension 2/2023    Ingrown toenail July 2023    Interstitial cystitis     Irritable bowel syndrome     Migraine     Neuropathy in diabetes 2023    Paronychia 8/2023    Mrsa and enterobacter cloacae       Allergies   Allergen Reactions    Pollen Extract Hives, Itching, Rash, Shortness Of Breath and Swelling    Tolu Grass Pollen Allergen Itching, Rash and Shortness Of Breath    Hydromorphone Hives     Pruritic rash    Hyoscyamine Itching    Hyoscyamine Sulfate Itching and Nausea And Vomiting    Oxycodone Itching     Itching rash    Adhesive Tape Rash     Peels skin    Beef Allergy Hives, Nausea Only, Rash and Swelling    Celery Hives, Itching, Rash and Swelling    Chicken Allergy Itching, Nausea Only, Rash and Photosensitivity    Codeine Itching, Nausea Only and Rash    Dust Mite Extract Hives, Itching, Rash and Swelling    Food Hives, Itching, Rash and Swelling     HAS FOOD ALLERGIES    Jacquie Hives, Itching, Rash and Swelling    Hydrocodone Itching and Rash    Morphine Hives, Itching, Nausea Only, Rash and Swelling    Nuts Hives, Itching, Rash and Swelling    Other Hives, Itching, Nausea  Only, Rash and Swelling     Potatoes, greens beans, and tuna fish, celery    Tuna Flavor Hives, Itching, Rash and Swelling    Turkey Hives, Itching, Rash and Swelling    Vancomycin Hives, Itching, Nausea And Vomiting, Rash and Swelling    Vanilla Hives, Rash and Swelling       Past Surgical History:   Procedure Laterality Date    CHOLECYSTECTOMY      EYE SURGERY      HEMORRHOIDECTOMY      HYSTERECTOMY      TOENAIL EXCISION  July 13, 2023       Family History   Problem Relation Age of Onset    Heart disease Mother     Osteoporosis Mother     Diabetes Maternal Grandmother     Cancer Brother        Social History     Socioeconomic History    Marital status:    Tobacco Use    Smoking status: Never   Vaping Use    Vaping status: Never Used   Substance and Sexual Activity    Alcohol use: No    Drug use: No    Sexual activity: Yes     Partners: Male     Birth control/protection: Post-menopausal     Prior to Admission medications    Medication Sig Start Date End Date Taking? Authorizing Provider   albuterol sulfate  (90 Base) MCG/ACT inhaler Inhale 2 puffs Every 4 (Four) Hours As Needed for Shortness of Air. 4/2/21   Nurys Ward PA   amphetamine-dextroamphetamine (ADDERALL) 10 MG tablet TAKE 1 TABLET BY MOUTH DAILY. MAX DAILY AMOUNT: 10 MG.    Simran Núñez MD   azelastine (ASTELIN) 0.1 % nasal spray 2 sprays into the nostril(s) as directed by provider 2 (Two) Times a Day. Use in each nostril as directed    Simran Núñez MD   Diclofenac Sodium (VOLTAREN) 1 % gel gel Apply 4 g topically to the appropriate area as directed 4 (Four) Times a Day. 1/4/24   Simran Núñez MD   doxycycline (VIBRAMYCIN) 100 MG capsule Take 1 capsule by mouth 2 (Two) Times a Day. 6/9/24   Dimitris Jacinto MD   DULoxetine (CYMBALTA) 30 MG capsule Take 2 capsules by mouth Daily.    Simran Núñez MD   EPINEPHrine (EPIPEN) 0.3 MG/0.3ML solution auto-injector injection  11/9/23   Simran Núñez  MD   Flovent Diskus 250 MCG/ACT aerosol powder  INHALE 1 PUFF INTO THE LUNGS TWICE A DAY    Simran Núñez MD   furosemide (LASIX) 20 MG tablet Take 1 tablet by mouth Daily.    Simran Núñez MD   levocetirizine (XYZAL) 5 MG tablet Take 1 tablet by mouth Every Evening.    Simran Núñez MD   linaclotide (LINZESS) 290 MCG capsule capsule Take 1 capsule by mouth Every Morning Before Breakfast.    Simran Núñez MD   loratadine (CLARITIN) 10 MG tablet Take 1 tablet by mouth Daily. 11/26/23   Simran Núñez MD   montelukast (SINGULAIR) 10 MG tablet Take 1 tablet by mouth Every Night.    Simran Núñez MD   mupirocin (BACTROBAN) 2 % ointment Apply to wound daily 1/17/24   Raquel Mccabe APRN   omeprazole (priLOSEC) 40 MG capsule Take 1 capsule by mouth Every Morning. 9/10/23   Simran Núñez MD   ondansetron ODT (ZOFRAN-ODT) 4 MG disintegrating tablet Place 1 tablet on the tongue Every 8 (Eight) Hours As Needed for Nausea or Vomiting. 4/20/24   Rosa Brown APRN   potassium chloride (K-DUR,KLOR-CON) 20 MEQ CR tablet Take 0.5 tablets by mouth 2 (Two) Times a Day.    Simran Núñez MD   potassium chloride 10 MEQ CR tablet Take 2 tablets by mouth Daily. 10/23/23   Simran Núñez MD   promethazine (PHENERGAN) 25 MG tablet Take 1 tablet by mouth. 1/4/24   Simran Núñez MD   SUMAtriptan (IMITREX) 25 MG tablet Take 1 tablet by mouth Every 2 (Two) Hours As Needed for Migraine. Take one tablet at onset of headache. May repeat dose one time in 2 hours if headache not relieved.    Simran Núñez MD   valsartan (DIOVAN) 160 MG tablet Take 1 tablet by mouth Daily. 9/19/23   Simran Núñez MD          Objective   Physical Exam  Constitutional 54-year-old female awake alert no acute distress triage vital signs reviewed examination of that left toe she is got a partial nail there was been removed before she has some mild irritation  redness noted to the toe there is no paronychia and there is no foul on there is no crepitus subcutaneous air fluids warm dry good dorsalis pedis and posterior tibialis pulses.  She moves her toes under including big toe.  No red streaks there is no pain to the bottom of the foot or to the bottom of the toe.  This is all localized around the toenail there is nothing currently to drain.  Calf is soft without tenderness.  No cords or Homans' sign.  She is awake alert and follows commands without focal weakness well-appearing  Procedures           ED Course      Results for orders placed or performed during the hospital encounter of 06/09/24   POC Glucose STAT    Specimen: Blood   Result Value Ref Range    Glucose 129 (H) 70 - 105 mg/dL     XR Toe 2+ View Left    Result Date: 6/9/2024  Impression: 1.No definite acute osseous abnormality is confirmed on this study. The need for follow-up should be based on clinical findings. 2.Soft tissue swelling first digit Electronically Signed: Madan Hagen MD  6/9/2024 12:59 PM EDT  Workstation ID: [id number]   Medications - No data to display                                         Medical Decision Making  Medical decision making patient had an Accu-Chek of 129 x-ray of the toe my independent review shows shows no evidence of osteomyelitis.  Radiology review the same.  There is been some concern of this before because she has had some of this before she states.  She has a history of MRSA.  Patient has no diabetic ulcer at this time or any evidence of sepsis or bacteremia or any evidence that suggest necrotizing fasciitis or fell on her significantly ingrown toenail or paronychia at this time.  Nothing drainable on exam.  We talked about that she is a diabetic she will watch this closely she will see her foot doctor soon as possible she was placed on doxycycline and discharged home for outpatient management.    Problems Addressed:  Cellulitis of toe of left foot: complicated acute  illness or injury    Amount and/or Complexity of Data Reviewed  Labs: ordered.  Radiology: ordered and independent interpretation performed. Decision-making details documented in ED Course.    Risk  Prescription drug management.        Final diagnoses:   Cellulitis of toe of left foot       ED Disposition  ED Disposition       ED Disposition   Discharge    Condition   Stable    Comment   --               Anjali Harris, APRN  [address]  [place] [address]  [phone]    In 1 day           Medication List        New Prescriptions      doxycycline 100 MG capsule  Commonly known as: VIBRAMYCIN  Take 1 capsule by mouth 2 (Two) Times a Day.            Stop      cefdinir 300 MG capsule  Commonly known as: OMNICEF               Where to Get Your Medications        These medications were sent to [facility]/pharmacy #[id number] - [place], [place] - [address] - [phone]  - [phone] FX  [address] [place] [address]      Phone: [phone]   doxycycline 100 MG capsule            Dimitris Jacinto MD  06/11/24 0834

## 2024-06-10 ENCOUNTER — TELEPHONE (OUTPATIENT)
Dept: PODIATRY | Facility: CLINIC | Age: 54
End: 2024-06-10
Payer: MEDICARE

## 2024-06-10 NOTE — TELEPHONE ENCOUNTER
PT CALLED BACK TO CHECK ON THIS REQUEST. SPOKE WITH OFFICE, THEY ADVISED TO CREATE REFERRAL AND SEND FOR REVIEW. RELAYED TO PT THAT I WILL CREATE A REFERRAL AND SEND FOR REVIEW, BUT PT STATES SHE WOULD RATHER SEE IF SHE CAN GET IT TAKEN CARE OF OTHERWISE AND WILL CALL BACK IF NEEDS TO BE SEEN HERE.

## 2024-06-10 NOTE — TELEPHONE ENCOUNTER
Provider: AGUS    Caller: TEVIN MAGUIRE    Relationship to Patient: PATIENT    Pharmacy: NA    Phone Number: [phone] (home)       Reason for Call: PATIENT WAS SEEN IN  E/R 6.9.24 FOR Cellulitis of toe of left foot  AND NEEDS AN APPT SOONER THAN FIRST AVAILABLE WHICH IS 6.19.24  CAN LEAVE APPT INFO ON VM    When was the patient last seen: 1.16.24

## 2024-06-12 ENCOUNTER — OFFICE VISIT (OUTPATIENT)
Dept: PODIATRY | Facility: CLINIC | Age: 54
End: 2024-06-12
Payer: MEDICARE

## 2024-06-12 VITALS — BODY MASS INDEX: 28.68 KG/M2 | HEART RATE: 81 BPM | RESPIRATION RATE: 20 BRPM | WEIGHT: 168 LBS | HEIGHT: 64 IN

## 2024-06-12 DIAGNOSIS — L60.3 ONYCHODYSTROPHY: ICD-10-CM

## 2024-06-12 DIAGNOSIS — M79.675 GREAT TOE PAIN, LEFT: Primary | ICD-10-CM

## 2024-06-12 DIAGNOSIS — L60.0 INGROWN LEFT GREATER TOENAIL: ICD-10-CM

## 2024-06-13 NOTE — PROGRESS NOTES
06/12/2024  Foot and Ankle Surgery - Established Patient/Follow-up  Provider: Dr. Leoncio Tucker DPM  Location: [facility]    Subjective:  Promise Miles is a 54 y.o. female.     Chief Complaint   Patient presents with    Left Foot - Pain, Initial Evaluation, Cellulitis     Pain 4     Initial Evaluation     FABIANO Tavarez aprn 2/28/2024       History of Present Illness  The patient presents for evaluation of nontraumatic toenail avulsion.    The patient reports persistent issues with her left great toe, which has not fully healed since her last consultation on 01/16/2024. Initially, the healing process was successful, however, upon wearing shoes and socks, the area becomes irritated and infected.      Allergies   Allergen Reactions    Pollen Extract Hives, Itching, Rash, Shortness Of Breath and Swelling    Tolu Grass Pollen Allergen Itching, Rash and Shortness Of Breath    Hydromorphone Hives     Pruritic rash    Hyoscyamine Itching    Hyoscyamine Sulfate Itching and Nausea And Vomiting    Oxycodone Itching     Itching rash    Adhesive Tape Rash     Peels skin    Beef Allergy Hives, Nausea Only, Rash and Swelling    Celery Hives, Itching, Rash and Swelling    Chicken Allergy Itching, Nausea Only, Rash and Photosensitivity    Codeine Itching, Nausea Only and Rash    Dust Mite Extract Hives, Itching, Rash and Swelling    Food Hives, Itching, Rash and Swelling     HAS FOOD ALLERGIES    Jacquie Hives, Itching, Rash and Swelling    Hydrocodone Itching and Rash    Morphine Hives, Itching, Nausea Only, Rash and Swelling    Nuts Hives, Itching, Rash and Swelling    Other Hives, Itching, Nausea Only, Rash and Swelling     Potatoes, greens beans, and tuna fish, celery    Tuna Flavor Hives, Itching, Rash and Swelling    Turkey Hives, Itching, Rash and Swelling    Vancomycin Hives, Itching, Nausea And Vomiting, Rash and Swelling    Vanilla Hives, Rash and Swelling       Current Outpatient Medications on File Prior to  Visit   Medication Sig Dispense Refill    albuterol sulfate  (90 Base) MCG/ACT inhaler Inhale 2 puffs Every 4 (Four) Hours As Needed for Shortness of Air. 6.7 g 0    amphetamine-dextroamphetamine (ADDERALL) 10 MG tablet TAKE 1 TABLET BY MOUTH DAILY. MAX DAILY AMOUNT: 10 MG.      azelastine (ASTELIN) 0.1 % nasal spray 2 sprays into the nostril(s) as directed by provider 2 (Two) Times a Day. Use in each nostril as directed      Diclofenac Sodium (VOLTAREN) 1 % gel gel Apply 4 g topically to the appropriate area as directed 4 (Four) Times a Day.      doxycycline (VIBRAMYCIN) 100 MG capsule Take 1 capsule by mouth 2 (Two) Times a Day. 14 capsule 0    DULoxetine (CYMBALTA) 30 MG capsule Take 2 capsules by mouth Daily.      EPINEPHrine (EPIPEN) 0.3 MG/0.3ML solution auto-injector injection       Flovent Diskus 250 MCG/ACT aerosol powder  INHALE 1 PUFF INTO THE LUNGS TWICE A DAY      furosemide (LASIX) 20 MG tablet Take 1 tablet by mouth Daily.      levocetirizine (XYZAL) 5 MG tablet Take 1 tablet by mouth Every Evening.      linaclotide (LINZESS) 290 MCG capsule capsule Take 1 capsule by mouth Every Morning Before Breakfast.      montelukast (SINGULAIR) 10 MG tablet Take 1 tablet by mouth Every Night.      mupirocin (BACTROBAN) 2 % ointment Apply to wound daily 30 g 2    omeprazole (priLOSEC) 40 MG capsule Take 1 capsule by mouth Every Morning.      ondansetron ODT (ZOFRAN-ODT) 4 MG disintegrating tablet Place 1 tablet on the tongue Every 8 (Eight) Hours As Needed for Nausea or Vomiting. 10 tablet 0    potassium chloride (K-DUR,KLOR-CON) 20 MEQ CR tablet Take 0.5 tablets by mouth 2 (Two) Times a Day.      potassium chloride 10 MEQ CR tablet Take 2 tablets by mouth Daily.      promethazine (PHENERGAN) 25 MG tablet Take 1 tablet by mouth.      SUMAtriptan (IMITREX) 25 MG tablet Take 1 tablet by mouth Every 2 (Two) Hours As Needed for Migraine. Take one tablet at onset of headache. May repeat dose one time in 2 hours  "if headache not relieved.      valsartan (DIOVAN) 160 MG tablet Take 1 tablet by mouth Daily.      loratadine (CLARITIN) 10 MG tablet Take 1 tablet by mouth Daily. (Patient not taking: Reported on 2024)       No current facility-administered medications on file prior to visit.       Objective   Pulse 81   Resp 20   Ht 162.6 cm (64\")   Wt 76.2 kg (168 lb)   BMI 28.84 kg/m²     Foot/Ankle Exam  Physical Exam  Foot/Ankle Exam     GENERAL  Appearance:  appears stated age  Orientation:  AAOx3  Affect:  appropriate  Gait:  unimpaired  Assistance:  independent  Right shoe gear: casual shoe and sock  Left shoe gear: casual shoe and sock     VASCULAR      Right Foot Vascularity   Dorsalis pedis:  1+  Edema grading:  None  CFT:  < 3 seconds  Pedal hair growth:  Present  Varicosities:  none      Left Foot Vascularity   Dorsalis pedis:  1+  Edema gradin+  CFT:  < 3 seconds  Pedal hair growth:  Present  Varicosities:  none     NEUROLOGIC      Right Foot Neurologic   Light touch sensation: normal  Protective Sensation using [place] Monofilament:   Sites intact: 9  Sites tested: 10      Left Foot Neurologic   Light touch sensation: normal  Protective Sensation using [place] Monofilament:   Sites intact: 10  Sites tested: 10     MUSCULOSKELETAL      Right Foot Musculoskeletal   Ecchymosis:  none  Tenderness:  none        Left Foot Musculoskeletal   Ecchymosis:  none  Tenderness:  toe 1 tenderness and toenail problem     DERMATOLOGIC       Right Foot Dermatologic   Skin  Right foot skin is intact.   Nails  1.  Absent. Positive for dystrophic nail.      Left Foot Dermatologic   Skin  Positive for erythema.   Nails  1.  Positive for dystrophic nail and ingrown toenail.     Right foot additional comments: 2024: Wound to right fifth toenail bed.     Left foot additional comments: 2024: Dry skin to left heel    24: Discomfort with palpation involving the left hallux toenail with mild " maceration, incurvation involving the medial and lateral borders with thickness involving the nail plate with minimal growth.  No areas of concern involving the rest of the foot.        Results      Assessment & Plan   Diagnoses and all orders for this visit:    1. Great toe pain, left (Primary)    2. Ingrown left greater toenail    3. Onychodystrophy      Assessment & Plan    Patient presents with continued pain involving her left great toe despite previous total nail avulsion.  She states that she has noticed the nail starting to grow back and has caused persistent pain.  She now has mild maceration involving the nailbed.  She is concerned that the nail is not growing incorrectly.  After evaluation, she does have incurvated medial lateral borders with signs of inflammation.  I reviewed options of continued conservative care versus consideration for definitive management requiring total nail avulsion with chemical matrixectomy.  We discussed benefits and risks of both approaches.  Patient has opted to proceed with avulsion and matrixectomy.  Procedure was performed without complication.  I have discussed postprocedural instruction sheet with her at length.  She is to monitor and call with any progressive issues or concerns.  Greater than 20 minutes spent before, during, and after evaluation for patient care.    Total Nail Avulsion with Chemical Matrixectomy: Left hallux    Consent and time out was performed before proceeding with the procedure.  Left hallux was cleansed with alcohol and the digit was blocked in a ring block fashion utilizing 5 mL of 1% lidocaine plain.  A digital tourniquet was applied to the digit.  The nail was lifted from the nail bed and nail margin with a [place].  The offending nail margins were grasped with a hemostat and removed. The nail borders were explored with a curette to ensure adequate removal.  Matrixectomy was performed utilizing three 30 second applications of 89% phenol on a  micro-tip applicator.  The area was then rinsed with alcohol to dilute the phenol. The nail fold and exposed nail bed were flushed with normal saline.  Antibiotic ointment followed by sterile compressive dressings were then applied.  The digital tourniquot was removed.  No excessive bleeding or complications were evident.  The patient tolerated procedure and local anesthetic well.              Patient or patient representative verbalized consent for the use of Ambient Listening during the visit with  RADHA Tucker DPM for chart documentation. 6/13/2024  07:10 EDT    RADHA Tucker DPM

## 2024-06-14 ENCOUNTER — PATIENT ROUNDING (BHMG ONLY) (OUTPATIENT)
Dept: PODIATRY | Facility: CLINIC | Age: 54
End: 2024-06-14
Payer: MEDICARE

## 2024-08-07 ENCOUNTER — OFFICE (AMBULATORY)
Dept: URBAN - METROPOLITAN AREA CLINIC 64 | Facility: CLINIC | Age: 54
End: 2024-08-07
Payer: MEDICARE

## 2024-08-07 VITALS
SYSTOLIC BLOOD PRESSURE: 118 MMHG | HEART RATE: 78 BPM | WEIGHT: 174 LBS | DIASTOLIC BLOOD PRESSURE: 82 MMHG | HEIGHT: 64 IN

## 2024-08-07 DIAGNOSIS — Z86.010 PERSONAL HISTORY OF COLONIC POLYPS: ICD-10-CM

## 2024-08-07 DIAGNOSIS — R93.3 ABNORMAL FINDINGS ON DIAGNOSTIC IMAGING OF OTHER PARTS OF DI: ICD-10-CM

## 2024-08-07 DIAGNOSIS — K59.04 CHRONIC IDIOPATHIC CONSTIPATION: ICD-10-CM

## 2024-08-07 DIAGNOSIS — Z83.719 FAMILY HISTORY OF COLON POLYPS, UNSPECIFIED: ICD-10-CM

## 2024-08-07 PROCEDURE — 99214 OFFICE O/P EST MOD 30 MIN: CPT | Performed by: NURSE PRACTITIONER

## 2024-08-07 RX ORDER — LUBIPROSTONE 24 UG/1
CAPSULE, GELATIN COATED ORAL
Qty: 60 | Refills: 6 | Status: ACTIVE
Start: 2024-08-07

## 2024-08-13 ENCOUNTER — OFFICE (AMBULATORY)
Age: 54
End: 2024-08-13
Payer: COMMERCIAL

## 2024-08-13 ENCOUNTER — OFFICE (AMBULATORY)
Dept: URBAN - METROPOLITAN AREA PATHOLOGY 19 | Facility: PATHOLOGY | Age: 54
End: 2024-08-13
Payer: MEDICARE

## 2024-08-13 ENCOUNTER — OFFICE (AMBULATORY)
Dept: URBAN - METROPOLITAN AREA PATHOLOGY 19 | Facility: PATHOLOGY | Age: 54
End: 2024-08-13
Payer: COMMERCIAL

## 2024-08-13 ENCOUNTER — ON CAMPUS - OUTPATIENT (AMBULATORY)
Dept: URBAN - METROPOLITAN AREA HOSPITAL 2 | Facility: HOSPITAL | Age: 54
End: 2024-08-13
Payer: MEDICARE

## 2024-08-13 VITALS
DIASTOLIC BLOOD PRESSURE: 93 MMHG | HEART RATE: 74 BPM | HEART RATE: 85 BPM | HEART RATE: 86 BPM | DIASTOLIC BLOOD PRESSURE: 78 MMHG | DIASTOLIC BLOOD PRESSURE: 80 MMHG | OXYGEN SATURATION: 96 % | HEART RATE: 76 BPM | DIASTOLIC BLOOD PRESSURE: 65 MMHG | SYSTOLIC BLOOD PRESSURE: 115 MMHG | DIASTOLIC BLOOD PRESSURE: 67 MMHG | OXYGEN SATURATION: 100 % | HEART RATE: 78 BPM | SYSTOLIC BLOOD PRESSURE: 123 MMHG | OXYGEN SATURATION: 97 % | WEIGHT: 171 LBS | SYSTOLIC BLOOD PRESSURE: 98 MMHG | HEART RATE: 77 BPM | HEIGHT: 64 IN | RESPIRATION RATE: 17 BRPM | SYSTOLIC BLOOD PRESSURE: 103 MMHG | SYSTOLIC BLOOD PRESSURE: 122 MMHG | RESPIRATION RATE: 16 BRPM | SYSTOLIC BLOOD PRESSURE: 106 MMHG | OXYGEN SATURATION: 99 % | SYSTOLIC BLOOD PRESSURE: 102 MMHG | SYSTOLIC BLOOD PRESSURE: 145 MMHG | TEMPERATURE: 96.8 F | DIASTOLIC BLOOD PRESSURE: 66 MMHG | DIASTOLIC BLOOD PRESSURE: 94 MMHG | HEART RATE: 92 BPM

## 2024-08-13 DIAGNOSIS — D12.0 BENIGN NEOPLASM OF CECUM: ICD-10-CM

## 2024-08-13 DIAGNOSIS — D12.3 BENIGN NEOPLASM OF TRANSVERSE COLON: ICD-10-CM

## 2024-08-13 DIAGNOSIS — K64.1 SECOND DEGREE HEMORRHOIDS: ICD-10-CM

## 2024-08-13 DIAGNOSIS — Z83.719 FAMILY HISTORY OF COLON POLYPS, UNSPECIFIED: ICD-10-CM

## 2024-08-13 DIAGNOSIS — Z09 ENCOUNTER FOR FOLLOW-UP EXAMINATION AFTER COMPLETED TREATMEN: ICD-10-CM

## 2024-08-13 DIAGNOSIS — K57.30 DIVERTICULOSIS OF LARGE INTESTINE WITHOUT PERFORATION OR ABS: ICD-10-CM

## 2024-08-13 DIAGNOSIS — Z86.010 PERSONAL HISTORY OF COLONIC POLYPS: ICD-10-CM

## 2024-08-13 PROBLEM — K63.5 POLYP OF COLON: Status: ACTIVE | Noted: 2024-08-13

## 2024-08-13 LAB
GI HISTOLOGY: A. CECUM: (no result)
GI HISTOLOGY: B. HEPATIC FLEXURE: (no result)
GI HISTOLOGY: PDF REPORT: (no result)

## 2024-08-13 PROCEDURE — 45385 COLONOSCOPY W/LESION REMOVAL: CPT | Mod: PT | Performed by: INTERNAL MEDICINE

## 2024-08-13 PROCEDURE — 88305 TISSUE EXAM BY PATHOLOGIST: CPT | Mod: 26 | Performed by: PATHOLOGY

## 2024-11-05 ENCOUNTER — OFFICE VISIT (OUTPATIENT)
Dept: PODIATRY | Facility: CLINIC | Age: 54
End: 2024-11-05
Payer: MEDICARE

## 2024-11-05 VITALS
OXYGEN SATURATION: 98 % | HEIGHT: 64 IN | HEART RATE: 80 BPM | WEIGHT: 168 LBS | BODY MASS INDEX: 28.68 KG/M2 | RESPIRATION RATE: 18 BRPM

## 2024-11-05 DIAGNOSIS — E11.42 DIABETIC PERIPHERAL NEUROPATHY ASSOCIATED WITH TYPE 2 DIABETES MELLITUS: ICD-10-CM

## 2024-11-05 DIAGNOSIS — M79.675 PAIN IN TOES OF BOTH FEET: Primary | ICD-10-CM

## 2024-11-05 DIAGNOSIS — M79.674 PAIN IN TOES OF BOTH FEET: Primary | ICD-10-CM

## 2024-11-05 DIAGNOSIS — E11.65 TYPE 2 DIABETES MELLITUS WITH HYPERGLYCEMIA, WITHOUT LONG-TERM CURRENT USE OF INSULIN: ICD-10-CM

## 2024-11-05 NOTE — PROGRESS NOTES
11/05/2024  Foot and Ankle Surgery - Established Patient/Follow-up  Provider: TEQUILA Hoffman   Location: [facility]    Subjective:  Promise Miles is a 54 y.o. female.     Chief Complaint   Patient presents with    Left Foot - Wound Check    Right Foot - Pain, Nail Problem, Foot Swelling, Peripheral Neuropathy    Follow-up     FABIANO Corby aprn 10/30/2024       History of Present Illness  The patient presents for evaluation of a toe problem.    She reports that her toes become sore when she wears shoes for extended periods. She has been researching wider toe shoes as a potential solution. She has been wearing larger shoes to alleviate the pain, which seems to help. However, her toes rub against each other, causing pieces of skin to fall off. She admits to scratching the area, which exacerbates the problem. She has not tried using insoles in her shoes.    Additionally, she mentions that her toenail was previously removed using an acid treatment, but the area still causes her discomfort.    She also reports that her blood sugar levels are not well-controlled, with her last A1c reading at 6.8. She occasionally experiences burning or tingling sensations in her feet. She is not currently taking gabapentin, although she has used it in the past.    She has been under significant stress due to her mother's illness and subsequent passing.       Allergies   Allergen Reactions    Pollen Extract Hives, Itching, Rash, Shortness Of Breath and Swelling    Tolu Grass Pollen Allergen Itching, Rash and Shortness Of Breath    Hydromorphone Hives     Pruritic rash    Hyoscyamine Itching    Hyoscyamine Sulfate Itching and Nausea And Vomiting    Oxycodone Itching     Itching rash    Adhesive Tape Rash     Peels skin    Beef Allergy Hives, Nausea Only, Rash and Swelling    Celery Hives, Itching, Rash and Swelling    Chicken Allergy Itching, Nausea Only, Rash and Photosensitivity    Codeine Itching, Nausea Only and Rash     Dust Mite Extract Hives, Itching, Rash and Swelling    Ginger Hives, Itching, Rash and Swelling    Hydrocodone Itching and Rash    Morphine Hives, Itching, Nausea Only, Rash and Swelling    Nuts Hives, Itching, Rash and Swelling    Other Hives, Itching, Nausea Only, Rash and Swelling     Potatoes, greens beans, and tuna fish, celery    Other Food Allergy Hives, Itching, Rash and Swelling     HAS FOOD ALLERGIES    Tuna Flavor Hives, Itching, Rash and Swelling    Turkey Hives, Itching, Rash and Swelling    Vancomycin Hives, Itching, Nausea And Vomiting, Rash and Swelling    Vanilla Hives, Rash and Swelling       Current Outpatient Medications on File Prior to Visit   Medication Sig Dispense Refill    albuterol sulfate  (90 Base) MCG/ACT inhaler Inhale 2 puffs Every 4 (Four) Hours As Needed for Shortness of Air. 6.7 g 0    amphetamine-dextroamphetamine (ADDERALL) 10 MG tablet TAKE 1 TABLET BY MOUTH DAILY. MAX DAILY AMOUNT: 10 MG.      azelastine (ASTELIN) 0.1 % nasal spray Administer 2 sprays into the nostril(s) as directed by provider 2 (Two) Times a Day. Use in each nostril as directed      Diclofenac Sodium (VOLTAREN) 1 % gel gel Apply 4 g topically to the appropriate area as directed 4 (Four) Times a Day.      doxycycline (VIBRAMYCIN) 100 MG capsule Take 1 capsule by mouth 2 (Two) Times a Day. 14 capsule 0    DULoxetine (CYMBALTA) 30 MG capsule Take 2 capsules by mouth Daily.      EPINEPHrine (EPIPEN) 0.3 MG/0.3ML solution auto-injector injection       Flovent Diskus 250 MCG/ACT aerosol powder  INHALE 1 PUFF INTO THE LUNGS TWICE A DAY      furosemide (LASIX) 20 MG tablet Take 1 tablet by mouth Daily.      levocetirizine (XYZAL) 5 MG tablet Take 1 tablet by mouth Every Evening.      linaclotide (LINZESS) 290 MCG capsule capsule Take 1 capsule by mouth Every Morning Before Breakfast.      montelukast (SINGULAIR) 10 MG tablet Take 1 tablet by mouth Every Night.      mupirocin (BACTROBAN) 2 % ointment Apply  "to wound daily 30 g 2    omeprazole (priLOSEC) 40 MG capsule Take 1 capsule by mouth Every Morning.      ondansetron ODT (ZOFRAN-ODT) 4 MG disintegrating tablet Place 1 tablet on the tongue Every 8 (Eight) Hours As Needed for Nausea or Vomiting. 10 tablet 0    potassium chloride (K-DUR,KLOR-CON) 20 MEQ CR tablet Take 0.5 tablets by mouth 2 (Two) Times a Day.      potassium chloride 10 MEQ CR tablet Take 2 tablets by mouth Daily.      promethazine (PHENERGAN) 25 MG tablet Take 1 tablet by mouth.      SUMAtriptan (IMITREX) 25 MG tablet Take 1 tablet by mouth Every 2 (Two) Hours As Needed for Migraine. Take one tablet at onset of headache. May repeat dose one time in 2 hours if headache not relieved.      valsartan (DIOVAN) 160 MG tablet Take 1 tablet by mouth Daily.      loratadine (CLARITIN) 10 MG tablet Take 1 tablet by mouth Daily. (Patient not taking: Reported on 6/12/2024)       No current facility-administered medications on file prior to visit.       Objective   Pulse 80   Resp 18   Ht 162.6 cm (64\")   Wt 76.2 kg (168 lb)   SpO2 98%   BMI 28.84 kg/m²     Foot/Ankle Exam    GENERAL  Appearance:  appears stated age  Orientation:  AAOx3  Affect:  appropriate  Gait:  unimpaired  Assistance:  independent  Right shoe gear: casual shoe and sock  Left shoe gear: casual shoe and sock    VASCULAR     Right Foot Vascularity   Dorsalis pedis:  2+  Skin temperature:  warm  Edema grading:  None  CFT:  < 3 seconds  Pedal hair growth:  Absent  Varicosities:  none     Left Foot Vascularity   Dorsalis pedis:  2+  Skin temperature:  warm  Edema grading:  None  CFT:  < 3 seconds  Pedal hair growth:  Absent  Varicosities:  none     NEUROLOGIC     Right Foot Neurologic   Light touch sensation: normal  Protective Sensation using [place] Monofilament:   Sites intact: 9  Sites tested: 10     Left Foot Neurologic   Light touch sensation: normal  Protective Sensation using [place] Monofilament:   Sites intact: " 9  Sites tested: 10    MUSCULOSKELETAL     Right Foot Musculoskeletal   Ecchymosis:  none  Tenderness:  toenail problem       Left Foot Musculoskeletal   Ecchymosis:  none  Tenderness:  toenail problem    DERMATOLOGIC      Right Foot Dermatologic   Skin  Positive for skin changes.      Left Foot Dermatologic   Skin  Positive for skin changes.   Physical Exam         Results  Laboratory Studies  A1c is 6.8.     Assessment & Plan   Diagnoses and all orders for this visit:    1. Pain in toes of both feet (Primary)    2. Diabetic peripheral neuropathy associated with type 2 diabetes mellitus    3. Type 2 diabetes mellitus with hyperglycemia, without long-term current use of insulin      Assessment & Plan  1. Toe discomfort.  The discomfort in the toe area is likely due to ill-fitting footwear. She was advised to consider wearing larger shoes, specifically a half size bigger, and to look for wider shoes with a mesh top for more comfort. Insoles with a metatarsal pad were also recommended to help spread the toes and prevent rubbing. Epsom salt soaks were suggested for additional relief. If symptoms persist, removal of toenails or the use of gabapentin may be considered.    2. Neuropathy.  Neuropathy might be contributing to the over-sensitivity in her feet. She was advised to avoid picking at her toes as it exacerbates the condition. Gabapentin was mentioned as a potential treatment if discomfort continues.    3. Diabetes Mellitus.  Her A1C is 6.8%, which is considered controlled. She was advised to continue monitoring her blood sugar levels and maintain her current management plan.    Follow-up  Return in 2 months for follow-up.       No orders of the defined types were placed in this encounter.         Patient or patient representative verbalized consent for the use of Ambient Listening during the visit with  TEQUILA Crews for chart documentation. 11/5/2024  16:38 EST

## 2024-11-07 ENCOUNTER — OFFICE (AMBULATORY)
Age: 54
End: 2024-11-07
Payer: MEDICAID

## 2024-11-07 ENCOUNTER — OFFICE (AMBULATORY)
Dept: URBAN - METROPOLITAN AREA CLINIC 64 | Facility: CLINIC | Age: 54
End: 2024-11-07
Payer: MEDICAID

## 2024-11-07 VITALS
HEART RATE: 67 BPM | HEART RATE: 67 BPM | DIASTOLIC BLOOD PRESSURE: 89 MMHG | HEART RATE: 67 BPM | HEIGHT: 64 IN | SYSTOLIC BLOOD PRESSURE: 135 MMHG | HEIGHT: 64 IN | DIASTOLIC BLOOD PRESSURE: 95 MMHG | SYSTOLIC BLOOD PRESSURE: 150 MMHG | DIASTOLIC BLOOD PRESSURE: 95 MMHG | SYSTOLIC BLOOD PRESSURE: 135 MMHG | WEIGHT: 177 LBS | HEIGHT: 64 IN | DIASTOLIC BLOOD PRESSURE: 89 MMHG | SYSTOLIC BLOOD PRESSURE: 150 MMHG | SYSTOLIC BLOOD PRESSURE: 150 MMHG | WEIGHT: 177 LBS | SYSTOLIC BLOOD PRESSURE: 150 MMHG | DIASTOLIC BLOOD PRESSURE: 95 MMHG | WEIGHT: 177 LBS | SYSTOLIC BLOOD PRESSURE: 150 MMHG | HEIGHT: 64 IN | SYSTOLIC BLOOD PRESSURE: 135 MMHG | DIASTOLIC BLOOD PRESSURE: 95 MMHG | HEIGHT: 64 IN | DIASTOLIC BLOOD PRESSURE: 89 MMHG | WEIGHT: 177 LBS | DIASTOLIC BLOOD PRESSURE: 89 MMHG | SYSTOLIC BLOOD PRESSURE: 150 MMHG | DIASTOLIC BLOOD PRESSURE: 95 MMHG | DIASTOLIC BLOOD PRESSURE: 89 MMHG | HEIGHT: 64 IN | WEIGHT: 177 LBS | HEART RATE: 67 BPM | SYSTOLIC BLOOD PRESSURE: 135 MMHG | HEART RATE: 67 BPM | HEIGHT: 64 IN | SYSTOLIC BLOOD PRESSURE: 135 MMHG | DIASTOLIC BLOOD PRESSURE: 89 MMHG | DIASTOLIC BLOOD PRESSURE: 95 MMHG | WEIGHT: 177 LBS | DIASTOLIC BLOOD PRESSURE: 89 MMHG | SYSTOLIC BLOOD PRESSURE: 150 MMHG | HEART RATE: 67 BPM | HEART RATE: 67 BPM | SYSTOLIC BLOOD PRESSURE: 135 MMHG | DIASTOLIC BLOOD PRESSURE: 95 MMHG | SYSTOLIC BLOOD PRESSURE: 135 MMHG | WEIGHT: 177 LBS

## 2024-11-07 DIAGNOSIS — K59.04 CHRONIC IDIOPATHIC CONSTIPATION: ICD-10-CM

## 2024-11-07 DIAGNOSIS — R14.0 ABDOMINAL DISTENSION (GASEOUS): ICD-10-CM

## 2024-11-07 DIAGNOSIS — Z86.0100 PERSONAL HISTORY OF COLON POLYPS, UNSPECIFIED: ICD-10-CM

## 2024-11-07 DIAGNOSIS — R15.0 INCOMPLETE DEFECATION: ICD-10-CM

## 2024-11-07 PROBLEM — Z86.010 PERSONAL HISTORY OF COLONIC POLYPS: Status: ACTIVE | Noted: 2017-07-12

## 2024-11-07 PROCEDURE — 99214 OFFICE O/P EST MOD 30 MIN: CPT | Performed by: NURSE PRACTITIONER

## 2024-11-07 RX ORDER — LUBIPROSTONE 24 UG/1
CAPSULE, GELATIN COATED ORAL
Qty: 60 | Refills: 6 | Status: ACTIVE
Start: 2024-11-07

## 2024-11-21 ENCOUNTER — TELEPHONE (OUTPATIENT)
Age: 54
End: 2024-11-21
Payer: MEDICARE

## 2024-11-21 RX ORDER — MUPIROCIN 20 MG/G
OINTMENT TOPICAL
Qty: 30 G | Refills: 2 | Status: SHIPPED | OUTPATIENT
Start: 2024-11-21

## 2024-11-21 NOTE — TELEPHONE ENCOUNTER
No answer for patient. Left message . Yojana has sent mupirocin in to your pharmacy to apply to the toe. Advise to do epsom salt soaks daily. Make sure water is not too hot and dry feet well after use. Yojana said she can see you tomorrow if you like or next week for a total nail removal. Just call office for appointment

## 2024-11-21 NOTE — TELEPHONE ENCOUNTER
I spoke with patient and she picked up abx ointment and will soak foot over weekend and see how it does. She will call if she needs appt

## 2024-11-21 NOTE — TELEPHONE ENCOUNTER
Hub staff attempted to follow warm transfer process and was unsuccessful     Caller: Promise Miles    Relationship to patient: Self    Best call back number: [phone] (home)     Patient is needing: PATIENT STATES THAT HER INFECTED TOE HAS BEEN GETTING INCREASINGLY WORSE SINCE HER LAST VISIT ON 11/5/24.   SHE STATES THAT IT IS RED, HAS PUSS, AND IS BLEEDING. THERE IS ALSO DEAD SKIN THAT IS COMING OFF WHEN SHE REMOVES HER SHOES.     PLEASE ADVISE PATIENT ASAP   I HAVE ASKED PATIENT TO SEND A COUPLE PICTURES THROUGH A [employer] MESSAGE TO HELP GIVE CLINICAL VIEW OF WHAT'S GOING ON

## 2024-11-26 ENCOUNTER — OFFICE VISIT (OUTPATIENT)
Age: 54
End: 2024-11-26
Payer: MEDICARE

## 2024-11-26 VITALS — BODY MASS INDEX: 28.68 KG/M2 | HEIGHT: 64 IN | OXYGEN SATURATION: 96 % | WEIGHT: 168 LBS | HEART RATE: 79 BPM

## 2024-11-26 DIAGNOSIS — L60.0 INGROWN TOENAIL: Primary | ICD-10-CM

## 2024-11-26 DIAGNOSIS — M79.674 GREAT TOE PAIN, RIGHT: ICD-10-CM

## 2024-11-26 NOTE — PROGRESS NOTES
11/26/2024  Foot and Ankle Surgery - Established Patient/Follow-up  Provider: TEQUILA Hoffman   Location: [facility]    Subjective:  Promise Miles is a 54 y.o. female.     Chief Complaint   Patient presents with    Right Foot - Follow-up, Nail Problem    Follow-up     Anjali Harris APRNPCP - 10/30/24       History of Present Illness  The patient is a 54-year-old female who presents for evaluation of toenail pain.    She reports experiencing pain in her toenail, particularly on the sides. She has a habit of picking at it after washing and cleaning, which she tries to avoid but occasionally succumbs to. Additionally, she mentions that the skin around the toenail becomes itchy when it dries.    She has a history of celiac disease and has been tested for lupus multiple times, all of which have returned negative results.    She also notes some skin irritation on her back, which she initially thought was acne, but now suspects may be related to her current condition.       Allergies   Allergen Reactions    Pollen Extract Hives, Itching, Rash, Shortness Of Breath and Swelling    Tolu Grass Pollen Allergen Itching, Rash and Shortness Of Breath    Hydromorphone Hives     Pruritic rash    Hyoscyamine Itching    Hyoscyamine Sulfate Itching and Nausea And Vomiting    Oxycodone Itching     Itching rash    Adhesive Tape Rash     Peels skin    Beef Allergy Hives, Nausea Only, Rash and Swelling    Celery Hives, Itching, Rash and Swelling    Chicken Allergy Itching, Nausea Only, Rash and Photosensitivity    Codeine Itching, Nausea Only and Rash    Dust Mite Extract Hives, Itching, Rash and Swelling    Jacquie Hives, Itching, Rash and Swelling    Hydrocodone Itching and Rash    Morphine Hives, Itching, Nausea Only, Rash and Swelling    Nuts Hives, Itching, Rash and Swelling    Other Hives, Itching, Nausea Only, Rash and Swelling     Potatoes, greens beans, and tuna fish, celery    Other Food Allergy Hives,  Itching, Rash and Swelling     HAS FOOD ALLERGIES    Tuna Flavor Hives, Itching, Rash and Swelling    Turkey Hives, Itching, Rash and Swelling    Vancomycin Hives, Itching, Nausea And Vomiting, Rash and Swelling    Vanilla Hives, Rash and Swelling       Current Outpatient Medications on File Prior to Visit   Medication Sig Dispense Refill    albuterol sulfate  (90 Base) MCG/ACT inhaler Inhale 2 puffs Every 4 (Four) Hours As Needed for Shortness of Air. 6.7 g 0    amphetamine-dextroamphetamine (ADDERALL) 10 MG tablet TAKE 1 TABLET BY MOUTH DAILY. MAX DAILY AMOUNT: 10 MG.      azelastine (ASTELIN) 0.1 % nasal spray Administer 2 sprays into the nostril(s) as directed by provider 2 (Two) Times a Day. Use in each nostril as directed      Diclofenac Sodium (VOLTAREN) 1 % gel gel Apply 4 g topically to the appropriate area as directed 4 (Four) Times a Day.      doxycycline (VIBRAMYCIN) 100 MG capsule Take 1 capsule by mouth 2 (Two) Times a Day. 14 capsule 0    DULoxetine (CYMBALTA) 30 MG capsule Take 2 capsules by mouth Daily.      EPINEPHrine (EPIPEN) 0.3 MG/0.3ML solution auto-injector injection       Flovent Diskus 250 MCG/ACT aerosol powder  INHALE 1 PUFF INTO THE LUNGS TWICE A DAY      furosemide (LASIX) 20 MG tablet Take 1 tablet by mouth Daily.      levocetirizine (XYZAL) 5 MG tablet Take 1 tablet by mouth Every Evening.      linaclotide (LINZESS) 290 MCG capsule capsule Take 1 capsule by mouth Every Morning Before Breakfast.      loratadine (CLARITIN) 10 MG tablet Take 1 tablet by mouth Daily.      montelukast (SINGULAIR) 10 MG tablet Take 1 tablet by mouth Every Night.      mupirocin (BACTROBAN) 2 % ointment Apply to wound daily 30 g 2    mupirocin (BACTROBAN) 2 % ointment Apply to wound daily 30 g 2    omeprazole (priLOSEC) 40 MG capsule Take 1 capsule by mouth Every Morning.      ondansetron ODT (ZOFRAN-ODT) 4 MG disintegrating tablet Place 1 tablet on the tongue Every 8 (Eight) Hours As Needed for Nausea  "or Vomiting. 10 tablet 0    potassium chloride (K-DUR,KLOR-CON) 20 MEQ CR tablet Take 0.5 tablets by mouth 2 (Two) Times a Day.      potassium chloride 10 MEQ CR tablet Take 2 tablets by mouth Daily.      promethazine (PHENERGAN) 25 MG tablet Take 1 tablet by mouth.      SUMAtriptan (IMITREX) 25 MG tablet Take 1 tablet by mouth Every 2 (Two) Hours As Needed for Migraine. Take one tablet at onset of headache. May repeat dose one time in 2 hours if headache not relieved.      valsartan (DIOVAN) 160 MG tablet Take 1 tablet by mouth Daily.       No current facility-administered medications on file prior to visit.       Objective   Pulse 79   Ht 162.6 cm (64\")   Wt 76.2 kg (168 lb)   SpO2 96%   BMI 28.84 kg/m²     Foot/Ankle Exam    GENERAL  Appearance:  appears stated age  Orientation:  AAOx3  Affect:  appropriate  Gait:  unimpaired  Assistance:  independent  Right shoe gear: casual shoe and sock  Left shoe gear: casual shoe and sock    VASCULAR     Right Foot Vascularity   Dorsalis pedis:  2+  Skin temperature:  warm  Edema grading:  None  CFT:  < 3 seconds  Pedal hair growth:  Present  Varicosities:  none     NEUROLOGIC     Right Foot Neurologic   Light touch sensation: normal    MUSCULOSKELETAL     Right Foot Musculoskeletal   Ecchymosis:  none  Tenderness:  toe 1 tenderness and toenail problem      DERMATOLOGIC      Right Foot Dermatologic   Skin  Positive for dryness, erythema and skin changes.   Nails  1.  Positive for ingrown toenail.    Physical Exam         Results       Assessment & Plan   Diagnoses and all orders for this visit:    1. Ingrown toenail (Primary)    2. Great toe pain, right      Assessment & Plan  1. Ingrown toenail.  The patient reports significant pain in the toenail, particularly on the sides. Physical examination confirms the presence of an ingrown toenail. A total nail removal with phenol was recommended and agreed upon by the patient. The procedure involves numbing the toe, removing " the nail, and applying acid to the nail bed to prevent regrowth. Post-procedure care includes managing pain with ibuprofen and Tylenol, and keeping the area clean. The patient was informed that the healing process would take about 8 weeks, with the first week involving some burning and throbbing.    Total Nail Avulsion with Chemical Matrixectomy: right hallux    Consent and time out was performed before proceeding with the procedure.  Right hallux was cleansed with alcohol and the digit was blocked in a ring block fashion utilizing 10 mL of 1% lidocaine plain.  A digital tourniquet was applied to the digit.  The nail was lifted from the nail bed and nail margin with a [place].  The offending nail margins were grasped with a hemostat and removed. The nail borders were explored with a curette to ensure adequate removal.  Matrixectomy was performed utilizing three 30 second applications of 89% phenol on a micro-tip applicator.  The area was then rinsed with alcohol to dilute the phenol. The nail fold and exposed nail bed were flushed with normal saline.  Antibiotic ointment followed by sterile compressive dressings were then applied.  The digital tourniquot was removed.  No excessive bleeding or complications were evident.  The patient tolerated procedure and local anesthetic well.     2. Possible eczema.  The patient reports itching and skin irritation around the toenail and on her back. Differential diagnosis includes eczema or athlete's foot. If symptoms persist 2 months after the toenail removal, treatment with a topical medication like Lotrisone will be considered.           No orders of the defined types were placed in this encounter.         Patient or patient representative verbalized consent for the use of Ambient Listening during the visit with  TEQUILA Crews for chart documentation. 11/26/2024  11:48 EST

## 2024-12-13 RX ORDER — GABAPENTIN 300 MG/1
300 CAPSULE ORAL DAILY PRN
Qty: 7 CAPSULE | Refills: 0 | Status: SHIPPED | OUTPATIENT
Start: 2024-12-13 | End: 2024-12-20

## 2025-04-01 ENCOUNTER — HOSPITAL ENCOUNTER (OUTPATIENT)
Facility: HOSPITAL | Age: 55
Setting detail: OBSERVATION
Discharge: HOME | End: 2025-04-02
Attending: EMERGENCY MEDICINE | Admitting: EMERGENCY MEDICINE
Payer: MEDICARE

## 2025-04-01 ENCOUNTER — APPOINTMENT (OUTPATIENT)
Dept: CT IMAGING | Facility: HOSPITAL | Age: 55
End: 2025-04-01
Payer: MEDICARE

## 2025-04-01 DIAGNOSIS — K57.92 ACUTE DIVERTICULITIS: Primary | ICD-10-CM

## 2025-04-01 LAB
ALBUMIN SERPL-MCNC: 4.1 G/DL (ref 3.5–5.2)
ALBUMIN/GLOB SERPL: 1.6 G/DL
ALP SERPL-CCNC: 79 U/L (ref 39–117)
ALT SERPL W P-5'-P-CCNC: 29 U/L (ref 1–33)
ANION GAP SERPL CALCULATED.3IONS-SCNC: 14.3 MMOL/L (ref 5–15)
AST SERPL-CCNC: 23 U/L (ref 1–32)
BASOPHILS # BLD AUTO: 0.02 10*3/MM3 (ref 0–0.2)
BASOPHILS NFR BLD AUTO: 0.2 % (ref 0–1.5)
BILIRUB SERPL-MCNC: 0.4 MG/DL (ref 0–1.2)
BILIRUB UR QL STRIP: NEGATIVE
BUN SERPL-MCNC: 19 MG/DL (ref 6–20)
BUN/CREAT SERPL: 22.4 (ref 7–25)
CALCIUM SPEC-SCNC: 8.9 MG/DL (ref 8.6–10.5)
CHLORIDE SERPL-SCNC: 103 MMOL/L (ref 98–107)
CLARITY UR: CLEAR
CO2 SERPL-SCNC: 22.7 MMOL/L (ref 22–29)
COLOR UR: YELLOW
CREAT SERPL-MCNC: 0.85 MG/DL (ref 0.57–1)
DEPRECATED RDW RBC AUTO: 43 FL (ref 37–54)
EGFRCR SERPLBLD CKD-EPI 2021: 80.5 ML/MIN/1.73
EOSINOPHIL # BLD AUTO: 0.09 10*3/MM3 (ref 0–0.4)
EOSINOPHIL NFR BLD AUTO: 1 % (ref 0.3–6.2)
ERYTHROCYTE [DISTWIDTH] IN BLOOD BY AUTOMATED COUNT: 12.2 % (ref 12.3–15.4)
GLOBULIN UR ELPH-MCNC: 2.5 GM/DL
GLUCOSE SERPL-MCNC: 187 MG/DL (ref 65–99)
GLUCOSE UR STRIP-MCNC: NEGATIVE MG/DL
HCT VFR BLD AUTO: 42.5 % (ref 34–46.6)
HGB BLD-MCNC: 14 G/DL (ref 12–15.9)
HGB UR QL STRIP.AUTO: NEGATIVE
IMM GRANULOCYTES # BLD AUTO: 0.02 10*3/MM3 (ref 0–0.05)
IMM GRANULOCYTES NFR BLD AUTO: 0.2 % (ref 0–0.5)
KETONES UR QL STRIP: NEGATIVE
LEUKOCYTE ESTERASE UR QL STRIP.AUTO: NEGATIVE
LIPASE SERPL-CCNC: 114 U/L (ref 13–60)
LYMPHOCYTES # BLD AUTO: 1.25 10*3/MM3 (ref 0.7–3.1)
LYMPHOCYTES NFR BLD AUTO: 14.2 % (ref 19.6–45.3)
MCH RBC QN AUTO: 31.5 PG (ref 26.6–33)
MCHC RBC AUTO-ENTMCNC: 32.9 G/DL (ref 31.5–35.7)
MCV RBC AUTO: 95.5 FL (ref 79–97)
MONOCYTES # BLD AUTO: 0.49 10*3/MM3 (ref 0.1–0.9)
MONOCYTES NFR BLD AUTO: 5.6 % (ref 5–12)
NEUTROPHILS NFR BLD AUTO: 6.93 10*3/MM3 (ref 1.7–7)
NEUTROPHILS NFR BLD AUTO: 78.8 % (ref 42.7–76)
NITRITE UR QL STRIP: NEGATIVE
NRBC BLD AUTO-RTO: 0 /100 WBC (ref 0–0.2)
PH UR STRIP.AUTO: <=5 [PH] (ref 5–8)
PLATELET # BLD AUTO: 176 10*3/MM3 (ref 140–450)
PMV BLD AUTO: 10.7 FL (ref 6–12)
POTASSIUM SERPL-SCNC: 4 MMOL/L (ref 3.5–5.2)
PROT SERPL-MCNC: 6.6 G/DL (ref 6–8.5)
PROT UR QL STRIP: NEGATIVE
RBC # BLD AUTO: 4.45 10*6/MM3 (ref 3.77–5.28)
SODIUM SERPL-SCNC: 140 MMOL/L (ref 136–145)
SP GR UR STRIP: 1.01 (ref 1–1.03)
UROBILINOGEN UR QL STRIP: NORMAL
WBC NRBC COR # BLD AUTO: 8.8 10*3/MM3 (ref 3.4–10.8)

## 2025-04-01 PROCEDURE — 99285 EMERGENCY DEPT VISIT HI MDM: CPT

## 2025-04-01 PROCEDURE — 83690 ASSAY OF LIPASE: CPT | Performed by: EMERGENCY MEDICINE

## 2025-04-01 PROCEDURE — G0378 HOSPITAL OBSERVATION PER HR: HCPCS

## 2025-04-01 PROCEDURE — 25010000002 METRONIDAZOLE 500 MG/100ML SOLUTION: Performed by: EMERGENCY MEDICINE

## 2025-04-01 PROCEDURE — 74176 CT ABD & PELVIS W/O CONTRAST: CPT

## 2025-04-01 PROCEDURE — 25010000002 ONDANSETRON PER 1 MG: Performed by: EMERGENCY MEDICINE

## 2025-04-01 PROCEDURE — 25010000002 DIPHENHYDRAMINE PER 50 MG: Performed by: EMERGENCY MEDICINE

## 2025-04-01 PROCEDURE — 25010000002 CEFTRIAXONE PER 250 MG: Performed by: EMERGENCY MEDICINE

## 2025-04-01 PROCEDURE — 25010000002 KETOROLAC TROMETHAMINE PER 15 MG: Performed by: EMERGENCY MEDICINE

## 2025-04-01 PROCEDURE — 81003 URINALYSIS AUTO W/O SCOPE: CPT | Performed by: EMERGENCY MEDICINE

## 2025-04-01 PROCEDURE — 96375 TX/PRO/DX INJ NEW DRUG ADDON: CPT

## 2025-04-01 PROCEDURE — 85025 COMPLETE CBC W/AUTO DIFF WBC: CPT | Performed by: EMERGENCY MEDICINE

## 2025-04-01 PROCEDURE — 80053 COMPREHEN METABOLIC PANEL: CPT | Performed by: EMERGENCY MEDICINE

## 2025-04-01 PROCEDURE — 96365 THER/PROPH/DIAG IV INF INIT: CPT

## 2025-04-01 RX ORDER — SODIUM CHLORIDE 0.9 % (FLUSH) 0.9 %
10 SYRINGE (ML) INJECTION AS NEEDED
Status: DISCONTINUED | OUTPATIENT
Start: 2025-04-01 | End: 2025-04-02

## 2025-04-01 RX ORDER — HYDROCODONE BITARTRATE AND ACETAMINOPHEN 5; 325 MG/1; MG/1
1 TABLET ORAL EVERY 6 HOURS PRN
Refills: 0 | Status: DISCONTINUED | OUTPATIENT
Start: 2025-04-01 | End: 2025-04-02 | Stop reason: HOSPADM

## 2025-04-01 RX ORDER — SODIUM CHLORIDE 0.9 % (FLUSH) 0.9 %
10 SYRINGE (ML) INJECTION EVERY 12 HOURS SCHEDULED
Status: DISCONTINUED | OUTPATIENT
Start: 2025-04-01 | End: 2025-04-02 | Stop reason: HOSPADM

## 2025-04-01 RX ORDER — POLYETHYLENE GLYCOL 3350 17 G/17G
17 POWDER, FOR SOLUTION ORAL DAILY PRN
Status: DISCONTINUED | OUTPATIENT
Start: 2025-04-01 | End: 2025-04-02 | Stop reason: HOSPADM

## 2025-04-01 RX ORDER — ONDANSETRON 2 MG/ML
4 INJECTION INTRAMUSCULAR; INTRAVENOUS ONCE
Status: COMPLETED | OUTPATIENT
Start: 2025-04-01 | End: 2025-04-01

## 2025-04-01 RX ORDER — METRONIDAZOLE 500 MG/100ML
500 INJECTION, SOLUTION INTRAVENOUS ONCE
Status: COMPLETED | OUTPATIENT
Start: 2025-04-01 | End: 2025-04-01

## 2025-04-01 RX ORDER — FLUTICASONE PROPIONATE 50 MCG
2 SPRAY, SUSPENSION (ML) NASAL DAILY
COMMUNITY

## 2025-04-01 RX ORDER — SODIUM CHLORIDE 0.9 % (FLUSH) 0.9 %
10 SYRINGE (ML) INJECTION AS NEEDED
Status: DISCONTINUED | OUTPATIENT
Start: 2025-04-01 | End: 2025-04-02 | Stop reason: HOSPADM

## 2025-04-01 RX ORDER — AMOXICILLIN 250 MG
2 CAPSULE ORAL 2 TIMES DAILY PRN
Status: DISCONTINUED | OUTPATIENT
Start: 2025-04-01 | End: 2025-04-02 | Stop reason: HOSPADM

## 2025-04-01 RX ORDER — METFORMIN HYDROCHLORIDE 500 MG/1
500 TABLET, EXTENDED RELEASE ORAL
COMMUNITY

## 2025-04-01 RX ORDER — BISACODYL 10 MG
10 SUPPOSITORY, RECTAL RECTAL DAILY PRN
Status: DISCONTINUED | OUTPATIENT
Start: 2025-04-01 | End: 2025-04-02 | Stop reason: HOSPADM

## 2025-04-01 RX ORDER — CETIRIZINE HYDROCHLORIDE 10 MG/1
10 TABLET ORAL DAILY
COMMUNITY

## 2025-04-01 RX ORDER — BISACODYL 5 MG/1
5 TABLET, DELAYED RELEASE ORAL DAILY PRN
Status: DISCONTINUED | OUTPATIENT
Start: 2025-04-01 | End: 2025-04-02 | Stop reason: HOSPADM

## 2025-04-01 RX ORDER — ONDANSETRON 2 MG/ML
4 INJECTION INTRAMUSCULAR; INTRAVENOUS EVERY 6 HOURS PRN
Status: DISCONTINUED | OUTPATIENT
Start: 2025-04-01 | End: 2025-04-02 | Stop reason: HOSPADM

## 2025-04-01 RX ORDER — SODIUM CHLORIDE 9 MG/ML
40 INJECTION, SOLUTION INTRAVENOUS AS NEEDED
Status: DISCONTINUED | OUTPATIENT
Start: 2025-04-01 | End: 2025-04-02 | Stop reason: HOSPADM

## 2025-04-01 RX ORDER — KETOROLAC TROMETHAMINE 30 MG/ML
30 INJECTION, SOLUTION INTRAMUSCULAR; INTRAVENOUS EVERY 6 HOURS PRN
Status: DISCONTINUED | OUTPATIENT
Start: 2025-04-01 | End: 2025-04-02 | Stop reason: HOSPADM

## 2025-04-01 RX ORDER — DIPHENHYDRAMINE HYDROCHLORIDE 50 MG/ML
25 INJECTION, SOLUTION INTRAMUSCULAR; INTRAVENOUS ONCE
Status: COMPLETED | OUTPATIENT
Start: 2025-04-01 | End: 2025-04-01

## 2025-04-01 RX ORDER — DIPHENHYDRAMINE HCL 25 MG
25 CAPSULE ORAL EVERY 6 HOURS PRN
Status: DISCONTINUED | OUTPATIENT
Start: 2025-04-01 | End: 2025-04-02 | Stop reason: HOSPADM

## 2025-04-01 RX ORDER — HYDROCODONE BITARTRATE AND ACETAMINOPHEN 7.5; 325 MG/1; MG/1
1 TABLET ORAL ONCE
Refills: 0 | Status: COMPLETED | OUTPATIENT
Start: 2025-04-01 | End: 2025-04-01

## 2025-04-01 RX ORDER — KETOROLAC TROMETHAMINE 30 MG/ML
15 INJECTION, SOLUTION INTRAMUSCULAR; INTRAVENOUS ONCE
Status: COMPLETED | OUTPATIENT
Start: 2025-04-01 | End: 2025-04-01

## 2025-04-01 RX ADMIN — METRONIDAZOLE 500 MG: 500 INJECTION, SOLUTION INTRAVENOUS at 21:21

## 2025-04-01 RX ADMIN — CEFTRIAXONE 2000 MG: 2 INJECTION, POWDER, FOR SOLUTION INTRAMUSCULAR; INTRAVENOUS at 20:26

## 2025-04-01 RX ADMIN — HYDROCODONE BITARTRATE AND ACETAMINOPHEN 1 TABLET: 7.5; 325 TABLET ORAL at 20:25

## 2025-04-01 RX ADMIN — ONDANSETRON 4 MG: 2 INJECTION, SOLUTION INTRAMUSCULAR; INTRAVENOUS at 18:27

## 2025-04-01 RX ADMIN — Medication 10 ML: at 20:26

## 2025-04-01 RX ADMIN — DIPHENHYDRAMINE HYDROCHLORIDE 25 MG: 50 INJECTION INTRAMUSCULAR; INTRAVENOUS at 20:25

## 2025-04-01 RX ADMIN — KETOROLAC TROMETHAMINE 15 MG: 30 INJECTION, SOLUTION INTRAMUSCULAR at 18:27

## 2025-04-01 NOTE — ED PROVIDER NOTES
Subjective   History of Present Illness  Chief complaint: Flank pain    55-year-old female presents with left flank pain.  Patient states pain started 3 or 4 days ago but got worse today.  She does have a history of kidney stones and states this feels similar.  She states the pain is radiating into the left side of the abdomen.  She has had nausea but no vomiting or diarrhea.  She denies fever.    History provided by:  Patient      Review of Systems   Constitutional:  Negative for fever.   HENT:  Negative for congestion.    Respiratory:  Negative for cough and shortness of breath.    Cardiovascular:  Negative for chest pain.   Gastrointestinal:  Positive for abdominal pain and nausea. Negative for diarrhea and vomiting.   Genitourinary:  Positive for flank pain. Negative for dysuria and hematuria.   Musculoskeletal:  Negative for back pain.   Neurological:  Negative for headaches.       Past Medical History:   Diagnosis Date    Asthma     Callus     Colitis     Diabetes mellitus July 2023    Difficulty walking 8/2023    Fibromyalgia     Hypertension 2/2023    Ingrown toenail July 2023    Interstitial cystitis     Irritable bowel syndrome     Migraine     Neuropathy in diabetes 2023    Paronychia 8/2023    Mrsa and enterobacter cloacae       Allergies   Allergen Reactions    Pollen Extract Hives, Itching, Rash, Shortness Of Breath and Swelling    Tolu Grass Pollen Allergen Itching, Rash and Shortness Of Breath    Hydromorphone Hives     Pruritic rash    Hyoscyamine Itching    Hyoscyamine Sulfate Itching and Nausea And Vomiting    Oxycodone Itching     Itching rash    Adhesive Tape Rash     Peels skin    Beef Allergy Hives, Nausea Only, Rash and Swelling    Celery Hives, Itching, Rash and Swelling    Chicken Allergy Itching, Nausea Only, Rash and Photosensitivity    Codeine Itching, Nausea Only and Rash    Dust Mite Extract Hives, Itching, Rash and Swelling    Jacquie Hives, Itching, Rash and Swelling    Hydrocodone  "Itching and Rash    Morphine Hives, Itching, Nausea Only, Rash and Swelling    Nuts Hives, Itching, Rash and Swelling    Other Hives, Itching, Nausea Only, Rash and Swelling     Potatoes, greens beans, and tuna fish, celery    Other Food Allergy Hives, Itching, Rash and Swelling     HAS FOOD ALLERGIES    Tuna Flavoring Agent (Non-Screening) Hives, Itching, Rash and Swelling    Turkey Hives, Itching, Rash and Swelling    Vancomycin Hives, Itching, Nausea And Vomiting, Rash and Swelling    Vanilla Hives, Rash and Swelling       Past Surgical History:   Procedure Laterality Date    CHOLECYSTECTOMY      EYE SURGERY      HEMORRHOIDECTOMY      HYSTERECTOMY      TOENAIL EXCISION  July 13, 2023       Family History   Problem Relation Age of Onset    Heart disease Mother     Osteoporosis Mother     Diabetes Maternal Grandmother     Cancer Brother     Diabetes Maternal Aunt        Social History     Socioeconomic History    Marital status:    Tobacco Use    Smoking status: Never     Passive exposure: Never   Vaping Use    Vaping status: Never Used   Substance and Sexual Activity    Alcohol use: No    Drug use: No    Sexual activity: Yes     Partners: Male     Birth control/protection: Post-menopausal       /78   Pulse 99   Temp 99 °F (37.2 °C) (Oral)   Resp 18   Ht 162.6 cm (64\")   Wt 79.7 kg (175 lb 11.3 oz)   SpO2 94%   BMI 30.16 kg/m²       Objective   Physical Exam  Vitals and nursing note reviewed.   Constitutional:       Appearance: Normal appearance.   HENT:      Head: Normocephalic and atraumatic.      Mouth/Throat:      Mouth: Mucous membranes are moist.   Cardiovascular:      Rate and Rhythm: Normal rate and regular rhythm.      Heart sounds: Normal heart sounds.   Pulmonary:      Effort: Pulmonary effort is normal. No respiratory distress.      Breath sounds: Normal breath sounds.   Abdominal:      General: Bowel sounds are normal.      Palpations: Abdomen is soft.      Tenderness: There is " abdominal tenderness in the left upper quadrant and left lower quadrant. There is left CVA tenderness.   Skin:     General: Skin is warm and dry.   Neurological:      Mental Status: She is alert and oriented to person, place, and time.         Procedures           ED Course      Results for orders placed or performed during the hospital encounter of 04/01/25   Comprehensive Metabolic Panel    Collection Time: 04/01/25  6:14 PM    Specimen: Arm, Right; Blood   Result Value Ref Range    Glucose 187 (H) 65 - 99 mg/dL    BUN 19 6 - 20 mg/dL    Creatinine 0.85 0.57 - 1.00 mg/dL    Sodium 140 136 - 145 mmol/L    Potassium 4.0 3.5 - 5.2 mmol/L    Chloride 103 98 - 107 mmol/L    CO2 22.7 22.0 - 29.0 mmol/L    Calcium 8.9 8.6 - 10.5 mg/dL    Total Protein 6.6 6.0 - 8.5 g/dL    Albumin 4.1 3.5 - 5.2 g/dL    ALT (SGPT) 29 1 - 33 U/L    AST (SGOT) 23 1 - 32 U/L    Alkaline Phosphatase 79 39 - 117 U/L    Total Bilirubin 0.4 0.0 - 1.2 mg/dL    Globulin 2.5 gm/dL    A/G Ratio 1.6 g/dL    BUN/Creatinine Ratio 22.4 7.0 - 25.0    Anion Gap 14.3 5.0 - 15.0 mmol/L    eGFR 80.5 >60.0 mL/min/1.73   Lipase    Collection Time: 04/01/25  6:14 PM    Specimen: Arm, Right; Blood   Result Value Ref Range    Lipase 114 (H) 13 - 60 U/L   Urinalysis With Culture If Indicated - Urine, Clean Catch    Collection Time: 04/01/25  6:14 PM    Specimen: Urine, Clean Catch   Result Value Ref Range    Color, UA Yellow Yellow, Straw    Appearance, UA Clear Clear    pH, UA <=5.0 5.0 - 8.0    Specific Gravity, UA 1.015 1.005 - 1.030    Glucose, UA Negative Negative    Ketones, UA Negative Negative    Bilirubin, UA Negative Negative    Blood, UA Negative Negative    Protein, UA Negative Negative    Leuk Esterase, UA Negative Negative    Nitrite, UA Negative Negative    Urobilinogen, UA 0.2 E.U./dL 0.2 - 1.0 E.U./dL   CBC Auto Differential    Collection Time: 04/01/25  6:14 PM    Specimen: Arm, Right; Blood   Result Value Ref Range    WBC 8.80 3.40 - 10.80  10*3/mm3    RBC 4.45 3.77 - 5.28 10*6/mm3    Hemoglobin 14.0 12.0 - 15.9 g/dL    Hematocrit 42.5 34.0 - 46.6 %    MCV 95.5 79.0 - 97.0 fL    MCH 31.5 26.6 - 33.0 pg    MCHC 32.9 31.5 - 35.7 g/dL    RDW 12.2 (L) 12.3 - 15.4 %    RDW-SD 43.0 37.0 - 54.0 fl    MPV 10.7 6.0 - 12.0 fL    Platelets 176 140 - 450 10*3/mm3    Neutrophil % 78.8 (H) 42.7 - 76.0 %    Lymphocyte % 14.2 (L) 19.6 - 45.3 %    Monocyte % 5.6 5.0 - 12.0 %    Eosinophil % 1.0 0.3 - 6.2 %    Basophil % 0.2 0.0 - 1.5 %    Immature Grans % 0.2 0.0 - 0.5 %    Neutrophils, Absolute 6.93 1.70 - 7.00 10*3/mm3    Lymphocytes, Absolute 1.25 0.70 - 3.10 10*3/mm3    Monocytes, Absolute 0.49 0.10 - 0.90 10*3/mm3    Eosinophils, Absolute 0.09 0.00 - 0.40 10*3/mm3    Basophils, Absolute 0.02 0.00 - 0.20 10*3/mm3    Immature Grans, Absolute 0.02 0.00 - 0.05 10*3/mm3    nRBC 0.0 0.0 - 0.2 /100 WBC     CT Abdomen Pelvis Without Contrast  Result Date: 4/1/2025  Impression: Mild pericolonic fat stranding/reparative change involving the distal descending and proximal sigmoid colon which may represent mild acute diverticulitis or mild nonspecific colitis. Electronically Signed: Jarek Stanley  4/1/2025 6:50 PM EDT  Workstation ID: [id number]                                                     Medical Decision Making  Amount and/or Complexity of Data Reviewed  Labs: ordered.  Radiology: ordered.    Risk  Prescription drug management.      Patient had the above evaluation.  Results were discussed with the patient.  White blood cell count is normal.  CMP is unremarkable.  Lipase is mildly elevated at 114.  Urinalysis shows no UTI had no hematuria.  CT of the abdomen and pelvis shows evidence of mild acute diverticulitis or mild nonspecific colitis as detailed above.  Patient was given Toradol and continued to have pain.  She is allergic to morphine and Dilaudid.  She states she can take hydrocodone.  She was given hydrocodone and Benadryl.  She was started on antibiotics.   She will be placed in observation for further pain control and reevaluation.      Final diagnoses:   Acute diverticulitis       ED Disposition  ED Disposition       ED Disposition   Decision to Admit    Condition   --    Comment   --               No follow-up provider specified.       Medication List      No changes were made to your prescriptions during this visit.            Kenny Hopkins MD  04/01/25 2016

## 2025-04-02 VITALS
WEIGHT: 154.54 LBS | RESPIRATION RATE: 16 BRPM | BODY MASS INDEX: 26.38 KG/M2 | OXYGEN SATURATION: 93 % | SYSTOLIC BLOOD PRESSURE: 110 MMHG | HEART RATE: 81 BPM | TEMPERATURE: 98 F | HEIGHT: 64 IN | DIASTOLIC BLOOD PRESSURE: 60 MMHG

## 2025-04-02 LAB
ANION GAP SERPL CALCULATED.3IONS-SCNC: 10.1 MMOL/L (ref 5–15)
BASOPHILS # BLD AUTO: 0.01 10*3/MM3 (ref 0–0.2)
BASOPHILS NFR BLD AUTO: 0.2 % (ref 0–1.5)
BUN SERPL-MCNC: 20 MG/DL (ref 6–20)
BUN/CREAT SERPL: 22.5 (ref 7–25)
CALCIUM SPEC-SCNC: 8.8 MG/DL (ref 8.6–10.5)
CHLORIDE SERPL-SCNC: 105 MMOL/L (ref 98–107)
CO2 SERPL-SCNC: 24.9 MMOL/L (ref 22–29)
CREAT SERPL-MCNC: 0.89 MG/DL (ref 0.57–1)
DEPRECATED RDW RBC AUTO: 45.9 FL (ref 37–54)
EGFRCR SERPLBLD CKD-EPI 2021: 76.2 ML/MIN/1.73
EOSINOPHIL # BLD AUTO: 0.14 10*3/MM3 (ref 0–0.4)
EOSINOPHIL NFR BLD AUTO: 2.6 % (ref 0.3–6.2)
ERYTHROCYTE [DISTWIDTH] IN BLOOD BY AUTOMATED COUNT: 12.6 % (ref 12.3–15.4)
GLUCOSE BLDC GLUCOMTR-MCNC: 118 MG/DL (ref 70–105)
GLUCOSE BLDC GLUCOMTR-MCNC: 128 MG/DL (ref 70–105)
GLUCOSE SERPL-MCNC: 142 MG/DL (ref 65–99)
HCT VFR BLD AUTO: 42.6 % (ref 34–46.6)
HGB BLD-MCNC: 13.6 G/DL (ref 12–15.9)
IMM GRANULOCYTES # BLD AUTO: 0.01 10*3/MM3 (ref 0–0.05)
IMM GRANULOCYTES NFR BLD AUTO: 0.2 % (ref 0–0.5)
LYMPHOCYTES # BLD AUTO: 1.25 10*3/MM3 (ref 0.7–3.1)
LYMPHOCYTES NFR BLD AUTO: 23.5 % (ref 19.6–45.3)
MAGNESIUM SERPL-MCNC: 2 MG/DL (ref 1.6–2.6)
MCH RBC QN AUTO: 31.6 PG (ref 26.6–33)
MCHC RBC AUTO-ENTMCNC: 31.9 G/DL (ref 31.5–35.7)
MCV RBC AUTO: 99.1 FL (ref 79–97)
MONOCYTES # BLD AUTO: 0.47 10*3/MM3 (ref 0.1–0.9)
MONOCYTES NFR BLD AUTO: 8.8 % (ref 5–12)
NEUTROPHILS NFR BLD AUTO: 3.45 10*3/MM3 (ref 1.7–7)
NEUTROPHILS NFR BLD AUTO: 64.7 % (ref 42.7–76)
NRBC BLD AUTO-RTO: 0 /100 WBC (ref 0–0.2)
PLATELET # BLD AUTO: 160 10*3/MM3 (ref 140–450)
PMV BLD AUTO: 10.9 FL (ref 6–12)
POTASSIUM SERPL-SCNC: 4.5 MMOL/L (ref 3.5–5.2)
RBC # BLD AUTO: 4.3 10*6/MM3 (ref 3.77–5.28)
SODIUM SERPL-SCNC: 140 MMOL/L (ref 136–145)
WBC NRBC COR # BLD AUTO: 5.33 10*3/MM3 (ref 3.4–10.8)

## 2025-04-02 PROCEDURE — G0378 HOSPITAL OBSERVATION PER HR: HCPCS

## 2025-04-02 PROCEDURE — 83735 ASSAY OF MAGNESIUM: CPT | Performed by: INTERNAL MEDICINE

## 2025-04-02 PROCEDURE — 82948 REAGENT STRIP/BLOOD GLUCOSE: CPT | Performed by: PHYSICIAN ASSISTANT

## 2025-04-02 PROCEDURE — 25010000002 ONDANSETRON PER 1 MG: Performed by: EMERGENCY MEDICINE

## 2025-04-02 PROCEDURE — 85025 COMPLETE CBC W/AUTO DIFF WBC: CPT | Performed by: EMERGENCY MEDICINE

## 2025-04-02 PROCEDURE — 96376 TX/PRO/DX INJ SAME DRUG ADON: CPT

## 2025-04-02 PROCEDURE — 80048 BASIC METABOLIC PNL TOTAL CA: CPT | Performed by: EMERGENCY MEDICINE

## 2025-04-02 PROCEDURE — 25810000003 SODIUM CHLORIDE 0.9 % SOLUTION: Performed by: PHYSICIAN ASSISTANT

## 2025-04-02 PROCEDURE — 96361 HYDRATE IV INFUSION ADD-ON: CPT

## 2025-04-02 RX ORDER — IBUPROFEN 600 MG/1
1 TABLET ORAL
Status: DISCONTINUED | OUTPATIENT
Start: 2025-04-02 | End: 2025-04-02 | Stop reason: HOSPADM

## 2025-04-02 RX ORDER — CETIRIZINE HYDROCHLORIDE 10 MG/1
10 TABLET ORAL DAILY
Status: DISCONTINUED | OUTPATIENT
Start: 2025-04-02 | End: 2025-04-02 | Stop reason: HOSPADM

## 2025-04-02 RX ORDER — MONTELUKAST SODIUM 10 MG/1
10 TABLET ORAL NIGHTLY
Status: DISCONTINUED | OUTPATIENT
Start: 2025-04-02 | End: 2025-04-02 | Stop reason: HOSPADM

## 2025-04-02 RX ORDER — METRONIDAZOLE 500 MG/1
500 TABLET ORAL EVERY 8 HOURS SCHEDULED
Status: DISCONTINUED | OUTPATIENT
Start: 2025-04-02 | End: 2025-04-02 | Stop reason: HOSPADM

## 2025-04-02 RX ORDER — INSULIN LISPRO 100 [IU]/ML
2-9 INJECTION, SOLUTION INTRAVENOUS; SUBCUTANEOUS
Status: DISCONTINUED | OUTPATIENT
Start: 2025-04-02 | End: 2025-04-02 | Stop reason: HOSPADM

## 2025-04-02 RX ORDER — POLYETHYLENE GLYCOL 3350 17 G/17G
17 POWDER, FOR SOLUTION ORAL DAILY PRN
Status: DISCONTINUED | OUTPATIENT
Start: 2025-04-02 | End: 2025-04-02 | Stop reason: SDUPTHER

## 2025-04-02 RX ORDER — SODIUM CHLORIDE 0.9 % (FLUSH) 0.9 %
10 SYRINGE (ML) INJECTION AS NEEDED
Status: DISCONTINUED | OUTPATIENT
Start: 2025-04-02 | End: 2025-04-02 | Stop reason: HOSPADM

## 2025-04-02 RX ORDER — BISACODYL 5 MG/1
5 TABLET, DELAYED RELEASE ORAL DAILY PRN
Status: DISCONTINUED | OUTPATIENT
Start: 2025-04-02 | End: 2025-04-02 | Stop reason: SDUPTHER

## 2025-04-02 RX ORDER — LUBIPROSTONE 24 UG/1
24 CAPSULE ORAL 2 TIMES DAILY
Status: DISCONTINUED | OUTPATIENT
Start: 2025-04-02 | End: 2025-04-02 | Stop reason: HOSPADM

## 2025-04-02 RX ORDER — SODIUM CHLORIDE 9 MG/ML
40 INJECTION, SOLUTION INTRAVENOUS AS NEEDED
Status: DISCONTINUED | OUTPATIENT
Start: 2025-04-02 | End: 2025-04-02 | Stop reason: HOSPADM

## 2025-04-02 RX ORDER — SODIUM CHLORIDE 9 MG/ML
125 INJECTION, SOLUTION INTRAVENOUS CONTINUOUS
Status: DISPENSED | OUTPATIENT
Start: 2025-04-02 | End: 2025-04-02

## 2025-04-02 RX ORDER — NICOTINE POLACRILEX 4 MG
15 LOZENGE BUCCAL
Status: DISCONTINUED | OUTPATIENT
Start: 2025-04-02 | End: 2025-04-02 | Stop reason: HOSPADM

## 2025-04-02 RX ORDER — SUMATRIPTAN 50 MG/1
100 TABLET, FILM COATED ORAL
Status: DISCONTINUED | OUTPATIENT
Start: 2025-04-02 | End: 2025-04-02 | Stop reason: HOSPADM

## 2025-04-02 RX ORDER — CEFDINIR 300 MG/1
300 CAPSULE ORAL 2 TIMES DAILY
Qty: 14 CAPSULE | Refills: 0 | Status: SHIPPED | OUTPATIENT
Start: 2025-04-02 | End: 2025-04-09

## 2025-04-02 RX ORDER — BISACODYL 10 MG
10 SUPPOSITORY, RECTAL RECTAL DAILY PRN
Status: DISCONTINUED | OUTPATIENT
Start: 2025-04-02 | End: 2025-04-02 | Stop reason: SDUPTHER

## 2025-04-02 RX ORDER — ACETAMINOPHEN 160 MG/5ML
650 SOLUTION ORAL EVERY 4 HOURS PRN
Status: DISCONTINUED | OUTPATIENT
Start: 2025-04-02 | End: 2025-04-02 | Stop reason: HOSPADM

## 2025-04-02 RX ORDER — DEXTROSE MONOHYDRATE 25 G/50ML
25 INJECTION, SOLUTION INTRAVENOUS
Status: DISCONTINUED | OUTPATIENT
Start: 2025-04-02 | End: 2025-04-02 | Stop reason: HOSPADM

## 2025-04-02 RX ORDER — HYDROCODONE BITARTRATE AND ACETAMINOPHEN 5; 325 MG/1; MG/1
1 TABLET ORAL EVERY 6 HOURS PRN
Refills: 0 | Status: DISCONTINUED | OUTPATIENT
Start: 2025-04-02 | End: 2025-04-02 | Stop reason: SDUPTHER

## 2025-04-02 RX ORDER — ENOXAPARIN SODIUM 100 MG/ML
40 INJECTION SUBCUTANEOUS DAILY
Status: DISCONTINUED | OUTPATIENT
Start: 2025-04-02 | End: 2025-04-02 | Stop reason: HOSPADM

## 2025-04-02 RX ORDER — ACETAMINOPHEN 650 MG/1
650 SUPPOSITORY RECTAL EVERY 4 HOURS PRN
Status: DISCONTINUED | OUTPATIENT
Start: 2025-04-02 | End: 2025-04-02 | Stop reason: HOSPADM

## 2025-04-02 RX ORDER — FUROSEMIDE 20 MG/1
20 TABLET ORAL DAILY
Status: DISCONTINUED | OUTPATIENT
Start: 2025-04-02 | End: 2025-04-02 | Stop reason: HOSPADM

## 2025-04-02 RX ORDER — AMOXICILLIN 250 MG
2 CAPSULE ORAL 2 TIMES DAILY PRN
Status: DISCONTINUED | OUTPATIENT
Start: 2025-04-02 | End: 2025-04-02 | Stop reason: SDUPTHER

## 2025-04-02 RX ORDER — HYDROCODONE BITARTRATE AND ACETAMINOPHEN 5; 325 MG/1; MG/1
1 TABLET ORAL EVERY 8 HOURS PRN
Qty: 6 TABLET | Refills: 0 | Status: SHIPPED | OUTPATIENT
Start: 2025-04-02

## 2025-04-02 RX ORDER — SODIUM CHLORIDE 0.9 % (FLUSH) 0.9 %
10 SYRINGE (ML) INJECTION EVERY 12 HOURS SCHEDULED
Status: DISCONTINUED | OUTPATIENT
Start: 2025-04-02 | End: 2025-04-02 | Stop reason: HOSPADM

## 2025-04-02 RX ORDER — METRONIDAZOLE 500 MG/1
500 TABLET ORAL 3 TIMES DAILY
Qty: 21 TABLET | Refills: 0 | Status: SHIPPED | OUTPATIENT
Start: 2025-04-02 | End: 2025-04-09

## 2025-04-02 RX ORDER — POTASSIUM CHLORIDE 1500 MG/1
20 TABLET, EXTENDED RELEASE ORAL DAILY
Status: DISCONTINUED | OUTPATIENT
Start: 2025-04-02 | End: 2025-04-02 | Stop reason: HOSPADM

## 2025-04-02 RX ORDER — DULOXETIN HYDROCHLORIDE 30 MG/1
90 CAPSULE, DELAYED RELEASE ORAL DAILY
Status: DISCONTINUED | OUTPATIENT
Start: 2025-04-02 | End: 2025-04-02 | Stop reason: HOSPADM

## 2025-04-02 RX ORDER — GABAPENTIN 300 MG/1
300 CAPSULE ORAL NIGHTLY PRN
Status: DISCONTINUED | OUTPATIENT
Start: 2025-04-02 | End: 2025-04-02 | Stop reason: HOSPADM

## 2025-04-02 RX ORDER — VALSARTAN 80 MG/1
160 TABLET ORAL DAILY
Status: DISCONTINUED | OUTPATIENT
Start: 2025-04-02 | End: 2025-04-02 | Stop reason: HOSPADM

## 2025-04-02 RX ORDER — ACETAMINOPHEN 325 MG/1
650 TABLET ORAL EVERY 4 HOURS PRN
Status: DISCONTINUED | OUTPATIENT
Start: 2025-04-02 | End: 2025-04-02 | Stop reason: HOSPADM

## 2025-04-02 RX ADMIN — METRONIDAZOLE 500 MG: 500 TABLET ORAL at 08:10

## 2025-04-02 RX ADMIN — ONDANSETRON 4 MG: 2 INJECTION, SOLUTION INTRAMUSCULAR; INTRAVENOUS at 13:03

## 2025-04-02 RX ADMIN — POTASSIUM CHLORIDE 20 MEQ: 1500 TABLET, EXTENDED RELEASE ORAL at 08:11

## 2025-04-02 RX ADMIN — CETIRIZINE HYDROCHLORIDE 10 MG: 10 TABLET, FILM COATED ORAL at 08:10

## 2025-04-02 RX ADMIN — LUBIPROSTONE 24 MCG: 24 CAPSULE, GELATIN COATED ORAL at 08:10

## 2025-04-02 RX ADMIN — HYDROCODONE BITARTRATE AND ACETAMINOPHEN 1 TABLET: 5; 325 TABLET ORAL at 08:11

## 2025-04-02 RX ADMIN — SODIUM CHLORIDE 125 ML/HR: 9 INJECTION, SOLUTION INTRAVENOUS at 08:12

## 2025-04-02 RX ADMIN — SODIUM CHLORIDE 500 ML: 9 INJECTION, SOLUTION INTRAVENOUS at 08:08

## 2025-04-02 RX ADMIN — SODIUM CHLORIDE 500 ML: 9 INJECTION, SOLUTION INTRAVENOUS at 13:37

## 2025-04-02 RX ADMIN — METRONIDAZOLE 500 MG: 500 TABLET ORAL at 13:02

## 2025-04-02 RX ADMIN — DULOXETINE 90 MG: 30 CAPSULE, DELAYED RELEASE ORAL at 08:10

## 2025-04-02 NOTE — PLAN OF CARE
Problem: Adult Inpatient Plan of Care  Goal: Absence of Hospital-Acquired Illness or Injury  Intervention: Identify and Manage Fall Risk  Recent Flowsheet Documentation  Taken 4/1/2025 2200 by Abbey Hare RN  Safety Promotion/Fall Prevention: safety round/check completed  Taken 4/1/2025 2139 by Abbey Hare RN  Safety Promotion/Fall Prevention: safety round/check completed     Problem: Adult Inpatient Plan of Care  Goal: Absence of Hospital-Acquired Illness or Injury  Intervention: Prevent Skin Injury  Recent Flowsheet Documentation  Taken 4/1/2025 2139 by Abbye Hare RN  Body Position: position changed independently     Problem: Adult Inpatient Plan of Care  Goal: Optimal Comfort and Wellbeing  Outcome: Progressing  Intervention: Provide Person-Centered Care  Recent Flowsheet Documentation  Taken 4/1/2025 2139 by Abbey Hare RN  Trust Relationship/Rapport:   care explained   questions encouraged   questions answered   Goal Outcome Evaluation:

## 2025-04-02 NOTE — PLAN OF CARE
Problem: Adult Inpatient Plan of Care  Goal: Plan of Care Review  Outcome: Progressing  Goal: Patient-Specific Goal (Individualized)  Outcome: Progressing  Goal: Absence of Hospital-Acquired Illness or Injury  Outcome: Progressing  Intervention: Identify and Manage Fall Risk  Recent Flowsheet Documentation  Taken 4/2/2025 1400 by Gabi Ramos RN  Safety Promotion/Fall Prevention:   safety round/check completed   assistive device/personal items within reach   clutter free environment maintained   nonskid shoes/slippers when out of bed   room organization consistent  Taken 4/2/2025 1200 by Gabi Ramos, RN  Safety Promotion/Fall Prevention:   safety round/check completed   assistive device/personal items within reach   clutter free environment maintained   nonskid shoes/slippers when out of bed   room organization consistent  Taken 4/2/2025 1000 by Gabi Ramos RN  Safety Promotion/Fall Prevention:   safety round/check completed   assistive device/personal items within reach   clutter free environment maintained   nonskid shoes/slippers when out of bed   room organization consistent  Taken 4/2/2025 0810 by Gabi Ramos RN  Safety Promotion/Fall Prevention:   safety round/check completed   assistive device/personal items within reach   clutter free environment maintained   nonskid shoes/slippers when out of bed  Intervention: Prevent Skin Injury  Recent Flowsheet Documentation  Taken 4/2/2025 0810 by Gabi Ramos RN  Body Position:   position changed independently   sitting up in bed  Skin Protection: transparent dressing maintained  Intervention: Prevent Infection  Recent Flowsheet Documentation  Taken 4/2/2025 0810 by Gabi Ramos, RN  Infection Prevention:   hand hygiene promoted   rest/sleep promoted   single patient room provided  Goal: Optimal Comfort and Wellbeing  Outcome: Progressing  Intervention: Monitor Pain and Promote Comfort  Recent Flowsheet Documentation  Taken 4/2/2025 0809 by Richard  Gabi, RN  Pain Management Interventions: pain medication given  Intervention: Provide Person-Centered Care  Recent Flowsheet Documentation  Taken 4/2/2025 0810 by Gabi Ramos, RN  Trust Relationship/Rapport:   care explained   questions answered  Goal: Readiness for Transition of Care  Outcome: Progressing   Goal Outcome Evaluation:   Aox4, RA, No tele orders.

## 2025-04-02 NOTE — CASE MANAGEMENT/SOCIAL WORK
Discharge Planning Assessment   [place]     Patient Name: Promise Miles  MRN: [id number]  Today's Date: 4/2/2025    Admit Date: 4/1/2025    Plan: Routine home. Patient to provide own DC transport.   Discharge Needs Assessment       Row Name 04/02/25 1028       Living Environment    People in Home child(idris), adult;grandchild(idris)    Name(s) of People in Home patient;s adult son Shalom and his family    Current Living Arrangements home    Potentially Unsafe Housing Conditions none    In the past 12 months has the electric, gas, oil, or water company threatened to shut off services in your home? No    Primary Care Provided by self    Provides Primary Care For no one    Family Caregiver if Needed child(idris), adult    Family Caregiver Names mae Rausch    Quality of Family Relationships helpful;involved;supportive    Able to Return to Prior Arrangements yes       Resource/Environmental Concerns    Resource/Environmental Concerns none    Transportation Concerns none       Transportation Needs    In the past 12 months, has lack of transportation kept you from medical appointments or from getting medications? no    In the past 12 months, has lack of transportation kept you from meetings, work, or from getting things needed for daily living? No       Food Insecurity    Within the past 12 months, you worried that your food would run out before you got the money to buy more. Never true    Within the past 12 months, the food you bought just didn't last and you didn't have money to get more. Never true       Transition Planning    Patient/Family Anticipates Transition to home    Patient/Family Anticipated Services at Transition none    Transportation Anticipated car, drives self;family or friend will provide       Discharge Needs Assessment    Readmission Within the Last 30 Days no previous admission in last 30 days    Equipment Currently Used at Home none    Concerns to be Addressed denies needs/concerns at this time;no discharge  needs identified    Anticipated Changes Related to Illness none    Equipment Needed After Discharge none                   Discharge Plan       Row Name 04/02/25 1029       Plan    Plan Routine home. Patient to provide own DC transport.    Plan Comments CM met with patient at the bedside. Confirmed PCP, insurance, and pharmacy. Patient agreeable in M2B. Patient denies any difficulty affording medications. Patient is not current with any HHC/OPPT/OT services. Patient lives at home with her adult son and his family, is independent with ADLS/IADLS, and drives. Patient will drive herself home at DC. DC Barriers: IV rocephin, IVF.                    Expected Discharge Date and Time       Expected Discharge Date Expected Discharge Time    Apr 2, 2025            Demographic Summary       Row Name 04/02/25 1028       General Information    Admission Type observation    Arrived From emergency department    Required Notices Provided Observation Status Notice    Referral Source admission list    Reason for Consult discharge planning    Preferred Language English       Contact Information    Permission Granted to Share Info With     Contact Information Obtained for                    Functional Status       Row Name 04/02/25 1028       Functional Status    Usual Activity Tolerance good    Current Activity Tolerance good       Functional Status, IADL    Medications independent    Meal Preparation independent    Housekeeping independent    Laundry independent    Shopping independent             Jose Matthew RN     Cell number [phone]  Office number [phone]

## 2025-04-02 NOTE — DISCHARGE SUMMARY
"[facility]    KimberlyAnjali hilario APRN    CHIEF COMPLAINT:     Abdominal pain    HISTORY OF PRESENT ILLNESS:    Obtained from admitting physician HPI on 4/1/2025:  55-year-old female presents with left flank pain.  Patient states pain started 3 or 4 days ago but got worse today.  She does have a history of kidney stones and states this feels similar.  She states the pain is radiating into the left side of the abdomen.  She has had nausea but no vomiting or diarrhea.  She denies fever.    04/02/25:  Patient confirms the HPI noted above including approximately 4 to 5-day history of some abdominal discomfort primarily in the left lower quadrant and left flank which became significantly worse on the day of presentation.  She has had some fatigue, nausea as well as nonbloody diarrhea along with fever but denies any vomiting, chest pain, dyspnea, palpitations or peripheral edema.  No known contacts or unusual p.o. intake is reported.  Patient does not smoke or drink alcohol.    Patient reevaluated on the afternoon of 4/2 reporting that she is \"tired\" but notes that her pain is improving and she is tolerating p.o. intake of both solids and liquids.  Discussed options with patient including continued IV antibiotics versus p.o. with patient preferring outpatient.  She was given a repeat fluid bolus and blood pressures is noted is normotensive with normal heart rate          Past Medical History:   Diagnosis Date    Asthma     Callus     Colitis     Diabetes mellitus July 2023    Difficulty walking 8/2023    Fibromyalgia     Hypertension 2/2023    Ingrown toenail July 2023    Interstitial cystitis     Irritable bowel syndrome     Migraine     Neuropathy in diabetes 2023    Paronychia 8/2023    Mrsa and enterobacter cloacae     Past Surgical History:   Procedure Laterality Date    CHOLECYSTECTOMY      EYE SURGERY      HEMORRHOIDECTOMY      HYSTERECTOMY      TOENAIL EXCISION  July 13, 2023     Family " History   Problem Relation Age of Onset    Heart disease Mother     Osteoporosis Mother     Diabetes Maternal Grandmother     Cancer Brother     Diabetes Maternal Aunt      Social History     Tobacco Use    Smoking status: Never     Passive exposure: Never   Vaping Use    Vaping status: Never Used   Substance Use Topics    Alcohol use: No    Drug use: No     Medications Prior to Admission   Medication Sig Dispense Refill Last Dose/Taking    amphetamine-dextroamphetamine (ADDERALL) 10 MG tablet Take 1 tablet by mouth Daily.   Taking    azelastine (ASTELIN) 0.1 % nasal spray Administer 2 sprays into the nostril(s) as directed by provider 2 (Two) Times a Day. Use in each nostril as directed   Taking    cetirizine (zyrTEC) 10 MG tablet Take 1 tablet by mouth Daily.   Taking    DULoxetine (CYMBALTA) 30 MG capsule Take 3 capsules by mouth Daily.   Taking    EPINEPHrine (EPIPEN) 0.3 MG/0.3ML solution auto-injector injection    Taking    fluticasone (FLONASE) 50 MCG/ACT nasal spray Administer 2 sprays into the nostril(s) as directed by provider Daily.   Taking    [Paused] furosemide (LASIX) 20 MG tablet Take 1 tablet by mouth Daily.   Taking    gabapentin (NEURONTIN) 300 MG capsule Take 1 capsule by mouth Daily As Needed (at bedtime) for up to 7 days. 7 capsule 0 Taking As Needed    linaclotide (LINZESS) 290 MCG capsule capsule Take 1 capsule by mouth Every Morning Before Breakfast.   Taking    metFORMIN ER (GLUCOPHAGE-XR) 500 MG 24 hr tablet Take 1 tablet by mouth Daily With Breakfast.   Taking    montelukast (SINGULAIR) 10 MG tablet Take 1 tablet by mouth Every Night.   Taking    potassium chloride 10 MEQ CR tablet Take 2 tablets by mouth Daily.   Taking    SUMAtriptan (IMITREX) 100 MG tablet Take 1 tablet by mouth Every 2 (Two) Hours As Needed for Migraine. Take one tablet at onset of headache. May repeat dose one time in 2 hours if headache not relieved.   Taking As Needed    [Paused] valsartan (DIOVAN) 160 MG tablet  Take 1 tablet by mouth Daily.   Taking     Allergies:  Pollen extract, Tolu grass pollen allergen, Hydromorphone, Hyoscyamine, Hyoscyamine sulfate, Oxycodone, Adhesive tape, Beef allergy, Celery, Chicken allergy, Codeine, Dust mite extract, Jacquie, Hydrocodone, Morphine, Nuts, Other, Other food allergy, Tuna flavoring agent (non-screening), Turkey, Vancomycin, and Vanilla    Immunization History   Administered Date(s) Administered    COVID-19 (MODERNA) 1st,2nd,3rd Dose Monovalent 03/16/2021, 04/20/2021    Covid-19 (Pfizer) Gray Cap Monovalent 04/04/2022           REVIEW OF SYSTEMS:    Review of Systems   Constitutional: Positive for malaise/fatigue.   HENT: Negative.     Eyes: Negative.    Cardiovascular: Negative.    Respiratory: Negative.     Skin: Negative.    Musculoskeletal: Negative.    Gastrointestinal:  Positive for abdominal pain and nausea. Negative for vomiting.   Genitourinary: Negative.    Neurological: Negative.    Psychiatric/Behavioral: Negative.         Vital Signs  Temp:  [97.5 °F (36.4 °C)-99 °F (37.2 °C)] 98 °F (36.7 °C)  Heart Rate:  [] 81  Resp:  [14-18] 16  BP: ()/(59-82) 95/68          Physical Exam:  Physical Exam  Constitutional:       General: She is not in acute distress.     Appearance: Normal appearance. She is normal weight. She is not ill-appearing, toxic-appearing or diaphoretic.   HENT:      Head: Normocephalic.      Right Ear: External ear normal.      Left Ear: External ear normal.      Nose: Nose normal.      Mouth/Throat:      Mouth: Mucous membranes are moist.   Eyes:      Extraocular Movements: Extraocular movements intact.   Cardiovascular:      Rate and Rhythm: Normal rate and regular rhythm.      Pulses: Normal pulses.   Pulmonary:      Effort: Pulmonary effort is normal.      Breath sounds: Normal breath sounds.   Abdominal:      Palpations: Abdomen is soft.      Comments: Bowel sounds hyperactive   Musculoskeletal:         General: Normal range of motion.       Cervical back: Normal range of motion.      Right lower leg: No edema.      Left lower leg: No edema.   Skin:     General: Skin is warm and dry.      Capillary Refill: Capillary refill takes less than 2 seconds.   Neurological:      General: No focal deficit present.      Mental Status: She is alert and oriented to person, place, and time.   Psychiatric:         Mood and Affect: Mood normal.         Behavior: Behavior normal.         Thought Content: Thought content normal.         Judgment: Judgment normal.           Emotional Behavior:   Normal   Debilities:  None  Results Review:    I reviewed the patient's new clinical results.  Lab Results (most recent)       Procedure Component Value Units Date/Time    POC Glucose 4x Daily Before Meals & at Bedtime [[id number]]  (Abnormal) Collected: 04/02/25 1146    Specimen: Blood Updated: 04/02/25 1148     Glucose 118 mg/dL      Comment: Serial Number: [id number]:  [id number]       POC Glucose 4x Daily Before Meals & at Bedtime [[id number]]  (Abnormal) Collected: 04/02/25 0740    Specimen: Blood Updated: 04/02/25 0741     Glucose 128 mg/dL      Comment: Serial Number: [id number]:  [id number]       Magnesium [[id number]]  (Normal) Collected: 04/02/25 0424    Specimen: Blood Updated: 04/02/25 0628     Magnesium 2.0 mg/dL     Basic Metabolic Panel [[id number]]  (Abnormal) Collected: 04/02/25 0424    Specimen: Blood Updated: 04/02/25 0553     Glucose 142 mg/dL      BUN 20 mg/dL      Creatinine 0.89 mg/dL      Sodium 140 mmol/L      Potassium 4.5 mmol/L      Comment: Specimen hemolyzed.  Result may be falsely elevated.        Chloride 105 mmol/L      CO2 24.9 mmol/L      Calcium 8.8 mg/dL      BUN/Creatinine Ratio 22.5     Anion Gap 10.1 mmol/L      eGFR 76.2 mL/min/1.73     Narrative:      GFR Categories in Chronic Kidney Disease (CKD)      GFR Category          GFR (mL/min/1.73)    Interpretation  G1                     90 or greater         Normal or high  (1)  G2                      60-89                Mild decrease (1)  G3a                   45-59                Mild to moderate decrease  G3b                   30-44                Moderate to severe decrease  G4                    15-29                Severe decrease  G5                    14 or less           Kidney failure          (1)In the absence of evidence of kidney disease, neither GFR category G1 or G2 fulfill the criteria for CKD.    eGFR calculation 2021 CKD-EPI creatinine equation, which does not include race as a factor    CBC Auto Differential [[id number]]  (Abnormal) Collected: 04/02/25 0424    Specimen: Blood Updated: 04/02/25 0508     WBC 5.33 10*3/mm3      RBC 4.30 10*6/mm3      Hemoglobin 13.6 g/dL      Hematocrit 42.6 %      MCV 99.1 fL      MCH 31.6 pg      MCHC 31.9 g/dL      RDW 12.6 %      RDW-SD 45.9 fl      MPV 10.9 fL      Platelets 160 10*3/mm3      Neutrophil % 64.7 %      Lymphocyte % 23.5 %      Monocyte % 8.8 %      Eosinophil % 2.6 %      Basophil % 0.2 %      Immature Grans % 0.2 %      Neutrophils, Absolute 3.45 10*3/mm3      Lymphocytes, Absolute 1.25 10*3/mm3      Monocytes, Absolute 0.47 10*3/mm3      Eosinophils, Absolute 0.14 10*3/mm3      Basophils, Absolute 0.01 10*3/mm3      Immature Grans, Absolute 0.01 10*3/mm3      nRBC 0.0 /100 WBC     Comprehensive Metabolic Panel [[id number]]  (Abnormal) Collected: 04/01/25 1814    Specimen: Blood from Arm, Right Updated: 04/01/25 1842     Glucose 187 mg/dL      BUN 19 mg/dL      Creatinine 0.85 mg/dL      Sodium 140 mmol/L      Potassium 4.0 mmol/L      Chloride 103 mmol/L      CO2 22.7 mmol/L      Calcium 8.9 mg/dL      Total Protein 6.6 g/dL      Albumin 4.1 g/dL      ALT (SGPT) 29 U/L      AST (SGOT) 23 U/L      Alkaline Phosphatase 79 U/L      Total Bilirubin 0.4 mg/dL      Globulin 2.5 gm/dL      A/G Ratio 1.6 g/dL      BUN/Creatinine Ratio 22.4     Anion Gap 14.3 mmol/L      eGFR 80.5 mL/min/1.73     Narrative:      GFR  Categories in Chronic Kidney Disease (CKD)      GFR Category          GFR (mL/min/1.73)    Interpretation  G1                     90 or greater         Normal or high (1)  G2                      60-89                Mild decrease (1)  G3a                   45-59                Mild to moderate decrease  G3b                   30-44                Moderate to severe decrease  G4                    15-29                Severe decrease  G5                    14 or less           Kidney failure          (1)In the absence of evidence of kidney disease, neither GFR category G1 or G2 fulfill the criteria for CKD.    eGFR calculation 2021 CKD-EPI creatinine equation, which does not include race as a factor    Lipase [[id number]]  (Abnormal) Collected: 04/01/25 1814    Specimen: Blood from Arm, Right Updated: 04/01/25 1842     Lipase 114 U/L     Urinalysis With Culture If Indicated - Urine, Clean Catch [[id number]]  (Normal) Collected: 04/01/25 1814    Specimen: Urine, Clean Catch Updated: 04/01/25 1823     Color, UA Yellow     Appearance, UA Clear     pH, UA <=5.0     Specific Gravity, UA 1.015     Glucose, UA Negative     Ketones, UA Negative     Bilirubin, UA Negative     Blood, UA Negative     Protein, UA Negative     Leuk Esterase, UA Negative     Nitrite, UA Negative     Urobilinogen, UA 0.2 E.U./dL    Narrative:      In absence of clinical symptoms, the presence of pyuria, bacteria, and/or nitrites on the urinalysis result does not correlate with infection.  Urine microscopic not indicated.    CBC & Differential [[id number]]  (Abnormal) Collected: 04/01/25 1814    Specimen: Blood from Arm, Right Updated: 04/01/25 1823    Narrative:      The following orders were created for panel order CBC & Differential.  Procedure                               Abnormality         Status                     ---------                               -----------         ------                     CBC Auto Differential[[id number]]         Abnormal            Final result                 Please view results for these tests on the individual orders.    CBC Auto Differential [[id number]]  (Abnormal) Collected: 04/01/25 1814    Specimen: Blood from Arm, Right Updated: 04/01/25 1823     WBC 8.80 10*3/mm3      RBC 4.45 10*6/mm3      Hemoglobin 14.0 g/dL      Hematocrit 42.5 %      MCV 95.5 fL      MCH 31.5 pg      MCHC 32.9 g/dL      RDW 12.2 %      RDW-SD 43.0 fl      MPV 10.7 fL      Platelets 176 10*3/mm3      Neutrophil % 78.8 %      Lymphocyte % 14.2 %      Monocyte % 5.6 %      Eosinophil % 1.0 %      Basophil % 0.2 %      Immature Grans % 0.2 %      Neutrophils, Absolute 6.93 10*3/mm3      Lymphocytes, Absolute 1.25 10*3/mm3      Monocytes, Absolute 0.49 10*3/mm3      Eosinophils, Absolute 0.09 10*3/mm3      Basophils, Absolute 0.02 10*3/mm3      Immature Grans, Absolute 0.02 10*3/mm3      nRBC 0.0 /100 WBC             Imaging Results (Most Recent)       Procedure Component Value Units Date/Time    CT Abdomen Pelvis Without Contrast [[id number]] Collected: 04/01/25 1843     Updated: 04/01/25 1852    Narrative:      CT ABDOMEN PELVIS WO CONTRAST    Date of Exam: 4/1/2025 6:00 PM EDT    Indication: Left flank pain.    Comparison: CT abdomen pelvis 4/20/2024    Technique: Axial CT images were obtained of the abdomen and pelvis without the administration of contrast. Sagittal and coronal reconstructions were performed.  Automated exposure control and iterative reconstruction methods were used.      Findings:  Included Chest: Stable appearing benign bilateral pulmonary nodules. Bibasal atelectasis.    Liver: No significant abnormality.     Gallbladder and biliary tree: Cholecystectomy     Spleen: No significant abnormality.     Pancreas: No significant abnormality.     Adrenal glands: No significant abnormality.     Kidneys and ureters: No significant abnormality.     Stomach and duodenum:No significant abnormality.    Small and large bowel: Colonic  diverticulosis. Mild pericolonic fat stranding/reparative change involving the distal descending and proximal sigmoid colon. No evidence of bowel obstruction. Normal-appearing appendix.    Peritoneal cavity: No free fluid or free air. Similar mild mesenteric panniculitis.    Bladder: Under distended and incompletely evaluated.     Pelvic organs: Hysterectomy     Vasculature: Mild atherosclerotic calcifications.     Lymph nodes: No pathologic appearing lymph nodes by imaging criteria.     Bones and soft tissues: Degenerative changes of the imaged spine. No acute osseous abnormality.      Impression:      Impression:  Mild pericolonic fat stranding/reparative change involving the distal descending and proximal sigmoid colon which may represent mild acute diverticulitis or mild nonspecific colitis.        Electronically Signed: Jarek Stanley    4/1/2025 6:50 PM EDT    Workstation ID: [id number]          reviewed    ECG/EMG Results (most recent)       None          not reviewed    Results for orders placed during the hospital encounter of 04/02/21    Duplex Venous Lower Extremity - Right    Interpretation Summary  · Normal right lower extremity venous duplex scan.          Microbiology Results (last 10 days)       ** No results found for the last 240 hours. **            Assessment & Plan     Acute diverticulitis       Colitis  Lab Results   Component Value Date    WBC 5.33 04/02/2025    AST 23 04/01/2025    ALT 29 04/01/2025    ALKPHOS 79 04/01/2025    BILITOT 0.4 04/01/2025    LIPASE 114 (H) 04/01/2025   -UA unremarkable  -CT of abdomen and pelvis: Mild pericolonic fat stranding involving the distal descending and proximal sigmoid colon possibly representing mild acute diverticulitis or colitis  -In the ED patient was given Rocephin and Flagyl along with Norco and Toradol  -IV fluid bolus followed by infusion    Diabetes mellitus  -Moderately controlled   Lab Results   Component Value Date    GLUCOSE 142 (H)  04/02/2025    GLUCOSE 187 (H) 04/01/2025    GLUCOSE 168 (H) 04/20/2024    GLUCOSE 101 (H) 04/02/2021   -Hold metformin  -Correctional insulin  -Diabetic diet  -Monitor before meals and at bedtime    Hypertension  -IV fluid bolus followed by infusion  BP Readings from Last 1 Encounters:   04/02/25 95/68   -Hold valsartan and Lasix for now  - Monitor while admitted    Depression  -Cymbalta    I discussed the patients findings and my recommendations with patient and nursing staff.     Discharge Diagnosis:      Acute diverticulitis      Hospital Course  Patient is a 55 y.o. female presented with abdominal pain with nausea and diarrhea with an HPI noted above.  WBCs were found to be within normal limits at 5.33 with CMP and CBC being generally unremarkable.  Lipase was found to be 114 and UA was obtained in the ED without significant abnormality noted.  CT of abdomen and pelvis showed signs consistent with mild acute diverticulitis or colitis in the distal descending and proximal sigmoid colon.  She was given Rocephin as well as Flagyl in the ED along with [place] and Toradol.  Blood pressure trended down she was mildly hypotensive on the morning following admission was given IV fluid bolus followed by infusion.  Patient continued to do well and is tolerating p.o. intake with improvement in pain noted.  Discussed continued IV antibiotics and hydration versus switch to p.o. and outpatient follow-up with GI once symptoms had improved with patient noting that she was feeling better and preferred outpatient follow-up.  At this time patient is felt to be in good condition for discharge with close follow-up with her PCP as well as GI on an outpatient basis.  She will continue to hold her antihypertensive medication and monitor and log heart rates and blood pressures to discuss with PCP at next visit.  Her full testing/results and plan were discussed with patient along with concerning/alarm symptoms for which to call 911/return  to the ED. a full course of Omnicef, Flagyl and short course of hydrocodone (which patient was able to tolerate during her admission) will be prescribed at discharge.  All questions were answered and she verbalizes her understanding and agreement.    Past Medical History:     Past Medical History:   Diagnosis Date    Asthma     Callus     Colitis     Diabetes mellitus July 2023    Difficulty walking 8/2023    Fibromyalgia     Hypertension 2/2023    Ingrown toenail July 2023    Interstitial cystitis     Irritable bowel syndrome     Migraine     Neuropathy in diabetes 2023    Paronychia 8/2023    Mrsa and enterobacter cloacae       Past Surgical History:     Past Surgical History:   Procedure Laterality Date    CHOLECYSTECTOMY      EYE SURGERY      HEMORRHOIDECTOMY      HYSTERECTOMY      TOENAIL EXCISION  July 13, 2023       Social History:   Social History     Socioeconomic History    Marital status:    Tobacco Use    Smoking status: Never     Passive exposure: Never   Vaping Use    Vaping status: Never Used   Substance and Sexual Activity    Alcohol use: No    Drug use: No    Sexual activity: Yes     Partners: Male     Birth control/protection: Post-menopausal       Procedures Performed         Consults:   Consults       Date and Time Order Name Status Description    4/1/2025  7:58 PM Hospitalist (on-call MD unless specified)              Condition on Discharge:     Stable    Discharge Disposition  Home or Self Care    Discharge Medications     Discharge Medications        PAUSE taking these medications        Instructions Start Date   furosemide 20 MG tablet  Wait to take this until your doctor or other care provider tells you to start again.  Commonly known as: LASIX   20 mg, Daily      valsartan 160 MG tablet  Wait to take this until your doctor or other care provider tells you to start again.  Commonly known as: DIOVAN   160 mg, Daily             New Medications        Instructions Start Date   cefdinir  300 MG capsule  Commonly known as: OMNICEF   300 mg, Oral, 2 Times Daily      HYDROcodone-acetaminophen 5-325 MG per tablet  Commonly known as: NORCO   1 tablet, Oral, Every 8 Hours PRN      metroNIDAZOLE 500 MG tablet  Commonly known as: Flagyl   500 mg, Oral, 3 Times Daily             Continue These Medications        Instructions Start Date   amphetamine-dextroamphetamine 10 MG tablet  Commonly known as: ADDERALL   Take 1 tablet by mouth Daily.      azelastine 0.1 % nasal spray  Commonly known as: ASTELIN   2 sprays, 2 Times Daily      cetirizine 10 MG tablet  Commonly known as: zyrTEC   10 mg, Daily      DULoxetine 30 MG capsule  Commonly known as: CYMBALTA   90 mg, Daily      EPINEPHrine 0.3 MG/0.3ML solution auto-injector injection  Commonly known as: EPIPEN       fluticasone 50 MCG/ACT nasal spray  Commonly known as: FLONASE   2 sprays, Daily      gabapentin 300 MG capsule  Commonly known as: NEURONTIN   300 mg, Oral, Daily PRN      linaclotide 290 MCG capsule capsule  Commonly known as: LINZESS   290 mcg, Every Morning Before Breakfast      metFORMIN  MG 24 hr tablet  Commonly known as: GLUCOPHAGE-XR   500 mg, Daily With Breakfast      montelukast 10 MG tablet  Commonly known as: SINGULAIR   10 mg, Nightly      potassium chloride 10 MEQ CR tablet   20 mEq, Daily      SUMAtriptan 100 MG tablet  Commonly known as: IMITREX   100 mg, Every 2 Hours PRN               Discharge Diet:     Activity at Discharge:   Activity Instructions       Driving Restrictions      Type of Restriction: Driving    Driving Restrictions: No Driving While Taking Narcotics            Follow-up Appointments  No future appointments.  Additional Instructions for the Follow-ups that You Need to Schedule       Discharge Follow-up with PCP   As directed       Currently Documented PCP:    Anjali Harris APRN    PCP Phone Number:    [phone]     Follow Up Details: 5 to 7 days        Discharge Follow-up with Specified Provider:  GI; 1 Month   As directed      To: GI   Follow Up: 1 Month                Test Results Pending at Discharge  Pending Results       None             Risk for Readmission (LACE) Score: 1 (4/2/2025  6:00 AM)      Greater than 30 minutes spent in discharge activities for this patient    Signature:Electronically signed by Jorge Gentile PA-C, 04/02/25, 4:14 PM EDT.

## 2025-04-02 NOTE — PLAN OF CARE
Problem: Adult Inpatient Plan of Care  Goal: Plan of Care Review  4/2/2025 1657 by Gabi Ramos RN  Outcome: Met  4/2/2025 1600 by Gabi Ramos RN  Outcome: Progressing  Goal: Patient-Specific Goal (Individualized)  4/2/2025 1657 by Gabi Ramos RN  Outcome: Met  4/2/2025 1600 by Gabi Ramos RN  Outcome: Progressing  Goal: Absence of Hospital-Acquired Illness or Injury  4/2/2025 1657 by Gabi Ramos RN  Outcome: Met  4/2/2025 1600 by Gabi Ramos RN  Outcome: Progressing  Intervention: Identify and Manage Fall Risk  Recent Flowsheet Documentation  Taken 4/2/2025 1400 by Gabi Ramos RN  Safety Promotion/Fall Prevention:   safety round/check completed   assistive device/personal items within reach   clutter free environment maintained   nonskid shoes/slippers when out of bed   room organization consistent  Taken 4/2/2025 1200 by Gabi Ramos RN  Safety Promotion/Fall Prevention:   safety round/check completed   assistive device/personal items within reach   clutter free environment maintained   nonskid shoes/slippers when out of bed   room organization consistent  Taken 4/2/2025 1000 by Gabi Ramos RN  Safety Promotion/Fall Prevention:   safety round/check completed   assistive device/personal items within reach   clutter free environment maintained   nonskid shoes/slippers when out of bed   room organization consistent  Taken 4/2/2025 0810 by Gabi Ramos RN  Safety Promotion/Fall Prevention:   safety round/check completed   assistive device/personal items within reach   clutter free environment maintained   nonskid shoes/slippers when out of bed  Intervention: Prevent Skin Injury  Recent Flowsheet Documentation  Taken 4/2/2025 0810 by Gabi Ramos RN  Body Position:   position changed independently   sitting up in bed  Skin Protection: transparent dressing maintained  Intervention: Prevent Infection  Recent Flowsheet Documentation  Taken 4/2/2025 0810 by Gabi Ramos RN  Infection  Prevention:   hand hygiene promoted   rest/sleep promoted   single patient room provided  Goal: Optimal Comfort and Wellbeing  4/2/2025 1657 by Gabi Ramos, RN  Outcome: Met  4/2/2025 1600 by Gabi Ramos RN  Outcome: Progressing  Intervention: Monitor Pain and Promote Comfort  Recent Flowsheet Documentation  Taken 4/2/2025 0809 by Gabi Ramos, RN  Pain Management Interventions: pain medication given  Intervention: Provide Person-Centered Care  Recent Flowsheet Documentation  Taken 4/2/2025 0810 by Gabi Ramos RN  Trust Relationship/Rapport:   care explained   questions answered  Goal: Readiness for Transition of Care  4/2/2025 1657 by Gabi Ramos RN  Outcome: Met  4/2/2025 1600 by Gabi Ramos RN  Outcome: Progressing   Goal Outcome Evaluation:      D/c to home

## 2025-04-03 NOTE — CASE MANAGEMENT/SOCIAL WORK
Case Management Discharge Note      Final Note: Routine home         Selected Continued Care - Discharged on 4/2/2025 Admission date: 4/1/2025 - Discharge disposition: Home or Self Care         Transportation Services  Private: Car    Final Discharge Disposition Code: 01 - home or self-care

## 2025-04-17 ENCOUNTER — OFFICE (AMBULATORY)
Dept: URBAN - METROPOLITAN AREA CLINIC 64 | Facility: CLINIC | Age: 55
End: 2025-04-17
Payer: MEDICARE

## 2025-04-17 VITALS
HEART RATE: 76 BPM | DIASTOLIC BLOOD PRESSURE: 89 MMHG | HEIGHT: 64 IN | WEIGHT: 178 LBS | SYSTOLIC BLOOD PRESSURE: 133 MMHG

## 2025-04-17 DIAGNOSIS — R15.0 INCOMPLETE DEFECATION: ICD-10-CM

## 2025-04-17 DIAGNOSIS — K59.04 CHRONIC IDIOPATHIC CONSTIPATION: ICD-10-CM

## 2025-04-17 DIAGNOSIS — R13.10 DYSPHAGIA, UNSPECIFIED: ICD-10-CM

## 2025-04-17 DIAGNOSIS — R10.10 UPPER ABDOMINAL PAIN, UNSPECIFIED: ICD-10-CM

## 2025-04-17 DIAGNOSIS — R14.0 ABDOMINAL DISTENSION (GASEOUS): ICD-10-CM

## 2025-04-17 PROCEDURE — 99214 OFFICE O/P EST MOD 30 MIN: CPT | Performed by: NURSE PRACTITIONER

## 2025-05-01 ENCOUNTER — OFFICE (AMBULATORY)
Dept: URBAN - METROPOLITAN AREA PATHOLOGY 19 | Facility: PATHOLOGY | Age: 55
End: 2025-05-01
Payer: COMMERCIAL

## 2025-05-01 DIAGNOSIS — R13.10 DYSPHAGIA, UNSPECIFIED: ICD-10-CM

## 2025-05-01 PROBLEM — K20.80 OTHER ESOPHAGITIS WITHOUT BLEEDING: Status: ACTIVE | Noted: 2025-05-01

## 2025-05-01 PROBLEM — K22.89 OTHER SPECIFIED DISEASE OF ESOPHAGUS: Status: ACTIVE | Noted: 2025-05-01

## 2025-05-05 ENCOUNTER — HOSPITAL ENCOUNTER (EMERGENCY)
Facility: HOSPITAL | Age: 55
Discharge: HOME | End: 2025-05-05
Attending: EMERGENCY MEDICINE | Admitting: EMERGENCY MEDICINE
Payer: MEDICARE

## 2025-05-05 ENCOUNTER — APPOINTMENT (OUTPATIENT)
Dept: CT IMAGING | Facility: HOSPITAL | Age: 55
End: 2025-05-05
Payer: MEDICARE

## 2025-05-05 VITALS
HEIGHT: 64 IN | HEART RATE: 70 BPM | RESPIRATION RATE: 17 BRPM | BODY MASS INDEX: 29.85 KG/M2 | SYSTOLIC BLOOD PRESSURE: 124 MMHG | DIASTOLIC BLOOD PRESSURE: 74 MMHG | WEIGHT: 174.82 LBS | OXYGEN SATURATION: 95 % | TEMPERATURE: 98.3 F

## 2025-05-05 DIAGNOSIS — N30.00 ACUTE CYSTITIS WITHOUT HEMATURIA: Primary | ICD-10-CM

## 2025-05-05 LAB
ALBUMIN SERPL-MCNC: 4.3 G/DL (ref 3.5–5.2)
ALBUMIN/GLOB SERPL: 1.7 G/DL
ALP SERPL-CCNC: 87 U/L (ref 39–117)
ALT SERPL W P-5'-P-CCNC: 14 U/L (ref 1–33)
ANION GAP SERPL CALCULATED.3IONS-SCNC: 10.7 MMOL/L (ref 5–15)
AST SERPL-CCNC: 17 U/L (ref 1–32)
BACTERIA UR QL AUTO: ABNORMAL /HPF
BASOPHILS # BLD AUTO: 0.02 10*3/MM3 (ref 0–0.2)
BASOPHILS NFR BLD AUTO: 0.2 % (ref 0–1.5)
BILIRUB SERPL-MCNC: 0.3 MG/DL (ref 0–1.2)
BILIRUB UR QL STRIP: NEGATIVE
BUN SERPL-MCNC: 17 MG/DL (ref 6–20)
BUN/CREAT SERPL: 21 (ref 7–25)
CALCIUM SPEC-SCNC: 9.5 MG/DL (ref 8.6–10.5)
CHLORIDE SERPL-SCNC: 105 MMOL/L (ref 98–107)
CLARITY UR: CLEAR
CO2 SERPL-SCNC: 22.3 MMOL/L (ref 22–29)
COLOR UR: YELLOW
CREAT SERPL-MCNC: 0.81 MG/DL (ref 0.57–1)
DEPRECATED RDW RBC AUTO: 45 FL (ref 37–54)
EGFRCR SERPLBLD CKD-EPI 2021: 85.3 ML/MIN/1.73
EOSINOPHIL # BLD AUTO: 0.2 10*3/MM3 (ref 0–0.4)
EOSINOPHIL NFR BLD AUTO: 2 % (ref 0.3–6.2)
ERYTHROCYTE [DISTWIDTH] IN BLOOD BY AUTOMATED COUNT: 12.5 % (ref 12.3–15.4)
GLOBULIN UR ELPH-MCNC: 2.5 GM/DL
GLUCOSE SERPL-MCNC: 114 MG/DL (ref 65–99)
GLUCOSE UR STRIP-MCNC: NEGATIVE MG/DL
HCT VFR BLD AUTO: 40.5 % (ref 34–46.6)
HGB BLD-MCNC: 13.1 G/DL (ref 12–15.9)
HGB UR QL STRIP.AUTO: ABNORMAL
HOLD SPECIMEN: NORMAL
HYALINE CASTS UR QL AUTO: ABNORMAL /LPF
IMM GRANULOCYTES # BLD AUTO: 0.03 10*3/MM3 (ref 0–0.05)
IMM GRANULOCYTES NFR BLD AUTO: 0.3 % (ref 0–0.5)
KETONES UR QL STRIP: NEGATIVE
LEUKOCYTE ESTERASE UR QL STRIP.AUTO: ABNORMAL
LIPASE SERPL-CCNC: 64 U/L (ref 13–60)
LYMPHOCYTES # BLD AUTO: 3.34 10*3/MM3 (ref 0.7–3.1)
LYMPHOCYTES NFR BLD AUTO: 33.5 % (ref 19.6–45.3)
MCH RBC QN AUTO: 31.7 PG (ref 26.6–33)
MCHC RBC AUTO-ENTMCNC: 32.3 G/DL (ref 31.5–35.7)
MCV RBC AUTO: 98.1 FL (ref 79–97)
MONOCYTES # BLD AUTO: 0.66 10*3/MM3 (ref 0.1–0.9)
MONOCYTES NFR BLD AUTO: 6.6 % (ref 5–12)
NEUTROPHILS NFR BLD AUTO: 5.71 10*3/MM3 (ref 1.7–7)
NEUTROPHILS NFR BLD AUTO: 57.4 % (ref 42.7–76)
NITRITE UR QL STRIP: NEGATIVE
NRBC BLD AUTO-RTO: 0 /100 WBC (ref 0–0.2)
PH UR STRIP.AUTO: 6 [PH] (ref 5–8)
PLATELET # BLD AUTO: 207 10*3/MM3 (ref 140–450)
PMV BLD AUTO: 10.8 FL (ref 6–12)
POTASSIUM SERPL-SCNC: 3.9 MMOL/L (ref 3.5–5.2)
PROT SERPL-MCNC: 6.8 G/DL (ref 6–8.5)
PROT UR QL STRIP: ABNORMAL
RBC # BLD AUTO: 4.13 10*6/MM3 (ref 3.77–5.28)
RBC # UR STRIP: ABNORMAL /HPF
REF LAB TEST METHOD: ABNORMAL
SODIUM SERPL-SCNC: 138 MMOL/L (ref 136–145)
SP GR UR STRIP: 1.02 (ref 1–1.03)
SQUAMOUS #/AREA URNS HPF: ABNORMAL /HPF
UROBILINOGEN UR QL STRIP: ABNORMAL
WBC # UR STRIP: ABNORMAL /HPF
WBC NRBC COR # BLD AUTO: 9.96 10*3/MM3 (ref 3.4–10.8)
WHOLE BLOOD HOLD COAG: NORMAL

## 2025-05-05 PROCEDURE — 87186 SC STD MICRODIL/AGAR DIL: CPT | Performed by: EMERGENCY MEDICINE

## 2025-05-05 PROCEDURE — 25010000002 CEFTRIAXONE PER 250 MG: Performed by: EMERGENCY MEDICINE

## 2025-05-05 PROCEDURE — 81001 URINALYSIS AUTO W/SCOPE: CPT | Performed by: EMERGENCY MEDICINE

## 2025-05-05 PROCEDURE — 87086 URINE CULTURE/COLONY COUNT: CPT | Performed by: EMERGENCY MEDICINE

## 2025-05-05 PROCEDURE — 99284 EMERGENCY DEPT VISIT MOD MDM: CPT

## 2025-05-05 PROCEDURE — 74176 CT ABD & PELVIS W/O CONTRAST: CPT

## 2025-05-05 PROCEDURE — 85025 COMPLETE CBC W/AUTO DIFF WBC: CPT | Performed by: EMERGENCY MEDICINE

## 2025-05-05 PROCEDURE — 83690 ASSAY OF LIPASE: CPT | Performed by: EMERGENCY MEDICINE

## 2025-05-05 PROCEDURE — 96365 THER/PROPH/DIAG IV INF INIT: CPT

## 2025-05-05 PROCEDURE — 87077 CULTURE AEROBIC IDENTIFY: CPT | Performed by: EMERGENCY MEDICINE

## 2025-05-05 PROCEDURE — 80053 COMPREHEN METABOLIC PANEL: CPT | Performed by: EMERGENCY MEDICINE

## 2025-05-05 RX ORDER — PHENAZOPYRIDINE HYDROCHLORIDE 200 MG/1
200 TABLET, FILM COATED ORAL 3 TIMES DAILY PRN
Qty: 6 TABLET | Refills: 0 | Status: SHIPPED | OUTPATIENT
Start: 2025-05-05 | End: 2025-05-07

## 2025-05-05 RX ORDER — SODIUM CHLORIDE 0.9 % (FLUSH) 0.9 %
10 SYRINGE (ML) INJECTION AS NEEDED
Status: DISCONTINUED | OUTPATIENT
Start: 2025-05-05 | End: 2025-05-05 | Stop reason: HOSPADM

## 2025-05-05 RX ADMIN — CEFTRIAXONE SODIUM 1000 MG: 1 INJECTION, POWDER, FOR SOLUTION INTRAMUSCULAR; INTRAVENOUS at 20:02

## 2025-05-05 NOTE — ED PROVIDER NOTES
Subjective   History of Present Illness  Chief complaint: Difficulty with urination    55-year-old female presents with difficulty with urination.  Patient states symptoms started yesterday morning.  She states she has felt like her bladder is full but when she goes to the bathroom she only gets a few dribbles out.  She has had urinary frequency.  She states she has passed a small amount of blood/mucous mix today.  She reports some pain in her left abdomen and left flank area.  She has had nausea but no vomiting or diarrhea.  She denies fever.    History provided by:  Patient      Review of Systems   Constitutional:  Negative for fever.   HENT:  Negative for congestion.    Respiratory:  Negative for cough and shortness of breath.    Cardiovascular:  Negative for chest pain.   Gastrointestinal:  Positive for abdominal pain and nausea. Negative for diarrhea and vomiting.   Genitourinary:  Positive for difficulty urinating, dysuria, flank pain and frequency.   Musculoskeletal:  Negative for back pain.   Neurological:  Negative for headaches.       Past Medical History:   Diagnosis Date    Asthma     Callus     Colitis     Diabetes mellitus July 2023    Difficulty walking 8/2023    Fibromyalgia     Hypertension 2/2023    Ingrown toenail July 2023    Interstitial cystitis     Irritable bowel syndrome     Migraine     Neuropathy in diabetes 2023    Paronychia 8/2023    Mrsa and enterobacter cloacae       Allergies   Allergen Reactions    Pollen Extract Hives, Itching, Rash, Shortness Of Breath and Swelling    Tolu Grass Pollen Allergen Itching, Rash and Shortness Of Breath    Hydromorphone Hives     Pruritic rash    Hyoscyamine Itching    Hyoscyamine Sulfate Itching and Nausea And Vomiting    Oxycodone Itching     Itching rash    Adhesive Tape Rash     Peels skin    Beef Allergy Hives, Nausea Only, Rash and Swelling    Celery Hives, Itching, Rash and Swelling    Chicken Allergy Itching, Nausea Only, Rash and  "Photosensitivity    Codeine Itching, Nausea Only and Rash    Dust Mite Extract Hives, Itching, Rash and Swelling    Jacquie Hives, Itching, Rash and Swelling    Hydrocodone Itching and Rash    Morphine Hives, Itching, Nausea Only, Rash and Swelling    Nuts Hives, Itching, Rash and Swelling    Other Hives, Itching, Nausea Only, Rash and Swelling     Potatoes, greens beans, and tuna fish, celery    Other Food Allergy Hives, Itching, Rash and Swelling     HAS FOOD ALLERGIES    Tuna Flavoring Agent (Non-Screening) Hives, Itching, Rash and Swelling    Turkey Hives, Itching, Rash and Swelling    Vancomycin Hives, Itching, Nausea And Vomiting, Rash and Swelling    Vanilla Hives, Rash and Swelling       Past Surgical History:   Procedure Laterality Date    CHOLECYSTECTOMY      EYE SURGERY      HEMORRHOIDECTOMY      HYSTERECTOMY      TOENAIL EXCISION  July 13, 2023       Family History   Problem Relation Age of Onset    Heart disease Mother     Osteoporosis Mother     Diabetes Maternal Grandmother     Cancer Brother     Diabetes Maternal Aunt        Social History     Socioeconomic History    Marital status:    Tobacco Use    Smoking status: Never     Passive exposure: Never   Vaping Use    Vaping status: Never Used   Substance and Sexual Activity    Alcohol use: No    Drug use: No    Sexual activity: Yes     Partners: Male     Birth control/protection: Post-menopausal       /84   Pulse 69   Temp 98.3 °F (36.8 °C) (Oral)   Resp 17   Ht 162.6 cm (64\")   Wt 79.3 kg (174 lb 13.2 oz)   SpO2 95%   BMI 30.01 kg/m²       Objective   Physical Exam  Vitals and nursing note reviewed.   Constitutional:       Appearance: Normal appearance.   HENT:      Head: Normocephalic and atraumatic.      Mouth/Throat:      Mouth: Mucous membranes are moist.   Cardiovascular:      Rate and Rhythm: Normal rate and regular rhythm.      Heart sounds: Normal heart sounds.   Pulmonary:      Effort: Pulmonary effort is normal. No " respiratory distress.      Breath sounds: Normal breath sounds.   Abdominal:      General: Bowel sounds are normal.      Palpations: Abdomen is soft.      Tenderness: There is abdominal tenderness in the suprapubic area and left lower quadrant. There is left CVA tenderness.   Skin:     General: Skin is warm and dry.   Neurological:      Mental Status: She is alert and oriented to person, place, and time.         Procedures           ED Course      Results for orders placed or performed during the hospital encounter of 05/05/25   Comprehensive Metabolic Panel    Collection Time: 05/05/25  5:47 PM    Specimen: Blood   Result Value Ref Range    Glucose 114 (H) 65 - 99 mg/dL    BUN 17 6 - 20 mg/dL    Creatinine 0.81 0.57 - 1.00 mg/dL    Sodium 138 136 - 145 mmol/L    Potassium 3.9 3.5 - 5.2 mmol/L    Chloride 105 98 - 107 mmol/L    CO2 22.3 22.0 - 29.0 mmol/L    Calcium 9.5 8.6 - 10.5 mg/dL    Total Protein 6.8 6.0 - 8.5 g/dL    Albumin 4.3 3.5 - 5.2 g/dL    ALT (SGPT) 14 1 - 33 U/L    AST (SGOT) 17 1 - 32 U/L    Alkaline Phosphatase 87 39 - 117 U/L    Total Bilirubin 0.3 0.0 - 1.2 mg/dL    Globulin 2.5 gm/dL    A/G Ratio 1.7 g/dL    BUN/Creatinine Ratio 21.0 7.0 - 25.0    Anion Gap 10.7 5.0 - 15.0 mmol/L    eGFR 85.3 >60.0 mL/min/1.73   Lipase    Collection Time: 05/05/25  5:47 PM    Specimen: Blood   Result Value Ref Range    Lipase 64 (H) 13 - 60 U/L   CBC Auto Differential    Collection Time: 05/05/25  5:47 PM    Specimen: Blood   Result Value Ref Range    WBC 9.96 3.40 - 10.80 10*3/mm3    RBC 4.13 3.77 - 5.28 10*6/mm3    Hemoglobin 13.1 12.0 - 15.9 g/dL    Hematocrit 40.5 34.0 - 46.6 %    MCV 98.1 (H) 79.0 - 97.0 fL    MCH 31.7 26.6 - 33.0 pg    MCHC 32.3 31.5 - 35.7 g/dL    RDW 12.5 12.3 - 15.4 %    RDW-SD 45.0 37.0 - 54.0 fl    MPV 10.8 6.0 - 12.0 fL    Platelets 207 140 - 450 10*3/mm3    Neutrophil % 57.4 42.7 - 76.0 %    Lymphocyte % 33.5 19.6 - 45.3 %    Monocyte % 6.6 5.0 - 12.0 %    Eosinophil % 2.0 0.3 -  6.2 %    Basophil % 0.2 0.0 - 1.5 %    Immature Grans % 0.3 0.0 - 0.5 %    Neutrophils, Absolute 5.71 1.70 - 7.00 10*3/mm3    Lymphocytes, Absolute 3.34 (H) 0.70 - 3.10 10*3/mm3    Monocytes, Absolute 0.66 0.10 - 0.90 10*3/mm3    Eosinophils, Absolute 0.20 0.00 - 0.40 10*3/mm3    Basophils, Absolute 0.02 0.00 - 0.20 10*3/mm3    Immature Grans, Absolute 0.03 0.00 - 0.05 10*3/mm3    nRBC 0.0 0.0 - 0.2 /100 WBC   Gold Top - SST    Collection Time: 05/05/25  5:47 PM   Result Value Ref Range    Extra Tube Hold for add-ons.    Light Blue Top    Collection Time: 05/05/25  5:47 PM   Result Value Ref Range    Extra Tube Hold for add-ons.    Urinalysis With Culture If Indicated - Urine, Clean Catch    Collection Time: 05/05/25  5:50 PM    Specimen: Urine, Clean Catch   Result Value Ref Range    Color, UA Yellow Yellow, Straw    Appearance, UA Clear Clear    pH, UA 6.0 5.0 - 8.0    Specific Gravity, UA 1.025 1.005 - 1.030    Glucose, UA Negative Negative    Ketones, UA Negative Negative    Bilirubin, UA Negative Negative    Blood, UA Small (1+) (A) Negative    Protein, UA 30 mg/dL (1+) (A) Negative    Leuk Esterase, UA Small (1+) (A) Negative    Nitrite, UA Negative Negative    Urobilinogen, UA 0.2 E.U./dL 0.2 - 1.0 E.U./dL   Urinalysis, Microscopic Only - Urine, Clean Catch    Collection Time: 05/05/25  5:50 PM    Specimen: Urine, Clean Catch   Result Value Ref Range    RBC, UA 6-10 (A) None Seen, 0-2 /HPF    WBC, UA 21-50 (A) None Seen, 0-2 /HPF    Bacteria, UA None Seen None Seen /HPF    Squamous Epithelial Cells, UA 0-2 None Seen, 0-2 /HPF    Hyaline Casts, UA 0-2 None Seen /LPF    Methodology Manual Light Microscopy        CT Abdomen Pelvis Without Contrast  Result Date: 5/5/2025  Impression: 1.There is some haziness around the bladder. A cystitis could not be excluded. 2.Colonic diverticulosis. 3.Moderate stool burden which might reflect some constipation. 4.Pulmonary nodules which have been suggested. 5.There is some  atelectasis in the lingula and bibasilar areas. Electronically Signed: Madan Hagen MD  5/5/2025 6:01 PM EDT  Workstation ID: [id number]                                                   Medical Decision Making  Problems Addressed:  Acute cystitis without hematuria: complicated acute illness or injury    Amount and/or Complexity of Data Reviewed  Labs: ordered.  Radiology: ordered.    Risk  Prescription drug management.      Patient had the above evaluation.  Results were discussed with the patient.  White blood cell count was normal.  CMP and lipase are unremarkable.  Urinalysis does show small leukocyte esterase, 6-10 red blood cells, 21-50 white blood cells.  CT of the abdomen pelvis is showing haziness around the bladder which could be related to cystitis.  There is also moderate stool burden.  Patient was given a dose of Rocephin in the emergency room.  She will be discharged with a prescription for Augmentin.  She will also be given a prescription for Pyridium.  She will be discharged to follow-up with her primary provider.      Final diagnoses:   Acute cystitis without hematuria       ED Disposition  ED Disposition       ED Disposition   Discharge    Condition   Stable    Comment   --               Anjali Harris, APRN  [address] [facility] [place] [address]  [phone]    Call in 2 days           Medication List        PAUSE taking these medications      furosemide 20 MG tablet  Wait to take this until your doctor or other care provider tells you to start again.  Commonly known as: LASIX     valsartan 160 MG tablet  Wait to take this until your doctor or other care provider tells you to start again.  Commonly known as: DIOVAN            New Prescriptions      amoxicillin-clavulanate 875-125 MG per tablet  Commonly known as: AUGMENTIN  Take 1 tablet by mouth 2 (Two) Times a Day for 7 days.     phenazopyridine 200 MG tablet  Commonly known as: PYRIDIUM  Take 1 tablet by mouth 3 (Three) Times a Day As  Needed for Bladder Spasms for up to 2 days.               Where to Get Your Medications        These medications were sent to [facility]/pharmacy #[id number] - [place], [place] - [address] [facility] - [phone]  - [phone] FX  [address] [address] [facility] [address] [place] [address]      Phone: [phone]   amoxicillin-clavulanate 875-125 MG per tablet  phenazopyridine 200 MG tablet            Kenny Hopkins MD  05/05/25 2008

## 2025-05-06 NOTE — DISCHARGE INSTRUCTIONS
Follow-up with your primary provider.  Return to the emergency room for any new or worsening symptoms or if you have any other questions or concerns.  Take medications as prescribed.

## 2025-05-07 LAB — BACTERIA SPEC AEROBE CULT: ABNORMAL

## 2025-05-07 NOTE — PROGRESS NOTES
Patient urine culture resulted with E. coli. Susceptible to Penicillins. Patient was given Rx for amoxicillin-clavulanic acid. Therapy is appropriate coverage. No further follow-up required.    Microbiology Results (last 10 days)       Procedure Component Value - Date/Time    Urine Culture - Urine, Urine, Clean Catch [[id number]]  (Abnormal)  (Susceptibility) Collected: 05/05/25 1750    Lab Status: Final result Specimen: Urine, Clean Catch Updated: 05/07/25 1035     Urine Culture 25,000 CFU/mL Escherichia coli    Narrative:      Colonization of the urinary tract without infection is common. Treatment is discouraged unless the patient is symptomatic, pregnant, or undergoing an invasive urologic procedure.    Susceptibility        Escherichia coli      SHERRI      Amoxicillin + Clavulanate <=2 ug/ml Susceptible      Ampicillin <=2 ug/ml Susceptible      Ampicillin + Sulbactam <=2 ug/ml Susceptible      Cefazolin (Urine) 2 ug/ml Susceptible      Cefepime <=0.12 ug/ml Susceptible      Ceftazidime <=0.5 ug/ml Susceptible      Ceftriaxone <=0.25 ug/ml Susceptible      Gentamicin <=1 ug/ml Susceptible      Levofloxacin 0.5 ug/ml Susceptible      Nitrofurantoin <=16 ug/ml Susceptible      Piperacillin + Tazobactam <=4 ug/ml Susceptible      Trimethoprim + Sulfamethoxazole <=20 ug/ml Susceptible                                   Stormy Simon RPH  5/7/2025 11:42 EDT

## 2025-06-02 ENCOUNTER — OFFICE VISIT (OUTPATIENT)
Dept: CARDIOLOGY | Facility: CLINIC | Age: 55
End: 2025-06-02
Payer: MEDICARE

## 2025-06-02 VITALS
OXYGEN SATURATION: 94 % | BODY MASS INDEX: 30.05 KG/M2 | WEIGHT: 176 LBS | HEIGHT: 64 IN | DIASTOLIC BLOOD PRESSURE: 86 MMHG | HEART RATE: 75 BPM | SYSTOLIC BLOOD PRESSURE: 134 MMHG

## 2025-06-02 DIAGNOSIS — I25.10 CORONARY ARTERY CALCIFICATION SEEN ON CT SCAN: ICD-10-CM

## 2025-06-02 DIAGNOSIS — E78.5 DYSLIPIDEMIA: ICD-10-CM

## 2025-06-02 DIAGNOSIS — I10 ESSENTIAL HYPERTENSION: ICD-10-CM

## 2025-06-02 DIAGNOSIS — R06.02 SHORTNESS OF BREATH: Primary | ICD-10-CM

## 2025-06-02 PROCEDURE — 1160F RVW MEDS BY RX/DR IN RCRD: CPT | Performed by: INTERNAL MEDICINE

## 2025-06-02 PROCEDURE — 93000 ELECTROCARDIOGRAM COMPLETE: CPT | Performed by: INTERNAL MEDICINE

## 2025-06-02 PROCEDURE — 99204 OFFICE O/P NEW MOD 45 MIN: CPT | Performed by: INTERNAL MEDICINE

## 2025-06-02 PROCEDURE — 1159F MED LIST DOCD IN RCRD: CPT | Performed by: INTERNAL MEDICINE

## 2025-06-02 RX ORDER — ROSUVASTATIN CALCIUM 10 MG/1
10 TABLET, COATED ORAL
COMMUNITY

## 2025-06-02 RX ORDER — BLOOD-GLUCOSE METER
1 KIT MISCELLANEOUS DAILY
COMMUNITY
Start: 2025-04-25

## 2025-06-02 RX ORDER — LANCETS 28 GAUGE
EACH MISCELLANEOUS
COMMUNITY
Start: 2025-04-25

## 2025-06-02 NOTE — PROGRESS NOTES
Encounter Date:06/02/2025      Patient ID: Promise Miles is a 55 y.o. female.    Chief Complaint:  Abnormal CT with coronary calcification.  Diabetes  Dyslipidemia  Hypertension      History of Present Illness  The patient is a pleasant 55-year-old white female is here for evaluation of medical problems.  Patient recently had abdominal CT scan and there was a mention of coronary calcification.  Patient has shortness of breath with activity.  Denies having any chest pain heaviness or tightness in the chest.  Patient has multiple coronary risk factors including diabetes dyslipidemia hypertension.      Assessment and Plan     ]]]]]]]]]]]]]]]]]]]]]]  Impression  ===========  -Abnormal CT. coronary calcification    - Shortness of breath with activity.    - Diabetes dyslipidemia hypertension    - Status post cholecystectomy hemorrhoidectomy hysterectomy.    - Family history of heart disease    - Non-smoker    - Multiple allergies (23)-please see the list.  Iodine or contrast was not listed.  ===========  Plan  =============  Shortness of breath with activity.  Abnormal CT coronary calcification.  EKG showed sinus rhythm without ischemic changes    Patient has multiple coronary risk factors.    Hypertension  134/86    Dyslipidemia-on Crestor    Diabetes-on metformin.    Ischemic cardiac workup.  Stress Cardiolite test  Echocardiogram    Medications were reviewed and updated.    Further plan will depend on patient's progress.    Reviewed and updated 6/2/2025.    Follow-up in the office soon after testing is completed.    ]]]]]]]]]]]]]]]]]]]]]]           Diagnosis Plan   1. Shortness of breath  Adult Transthoracic Echo Complete W/ Cont if Necessary Per Protocol    Stress Test With Myocardial Perfusion One Day    ECG 12 Lead      2. Essential hypertension  Adult Transthoracic Echo Complete W/ Cont if Necessary Per Protocol    Stress Test With Myocardial Perfusion One Day    ECG 12 Lead      3. Dyslipidemia  Adult Transthoracic  Echo Complete W/ Cont if Necessary Per Protocol    Stress Test With Myocardial Perfusion One Day    ECG 12 Lead      4. Coronary artery calcification seen on CT scan  Adult Transthoracic Echo Complete W/ Cont if Necessary Per Protocol    Stress Test With Myocardial Perfusion One Day    ECG 12 Lead      LAB RESULTS (LAST 7 DAYS)    CBC        BMP        CMP         BNP        TROPONIN        CoAg        Creatinine Clearance  Estimated Creatinine Clearance: 79.2 mL/min (by C-G formula based on SCr of 0.81 mg/dL).    ABG        Radiology  No radiology results for the last day                The following portions of the patient's history were reviewed and updated as appropriate: allergies, current medications, past family history, past medical history, past social history, past surgical history, and problem list.    Review of Systems   Constitutional: Positive for malaise/fatigue.   Cardiovascular:  Positive for leg swelling and palpitations. Negative for chest pain and dyspnea on exertion.   Respiratory:  Negative for cough and shortness of breath.    Gastrointestinal:  Positive for nausea. Negative for abdominal pain and vomiting.   Neurological:  Positive for dizziness, light-headedness and numbness. Negative for headaches and weakness.   All other systems reviewed and are negative.        Current Outpatient Medications:     amphetamine-dextroamphetamine (ADDERALL) 10 MG tablet, Take 1 tablet by mouth Daily., Disp: , Rfl:     azelastine (ASTELIN) 0.1 % nasal spray, Administer 2 sprays into the nostril(s) as directed by provider 2 (Two) Times a Day. Use in each nostril as directed, Disp: , Rfl:     cetirizine (zyrTEC) 10 MG tablet, Take 1 tablet by mouth Daily., Disp: , Rfl:     DULoxetine (CYMBALTA) 30 MG capsule, Take 3 capsules by mouth Daily., Disp: , Rfl:     EPINEPHrine (EPIPEN) 0.3 MG/0.3ML solution auto-injector injection, , Disp: , Rfl:     fluticasone (FLONASE) 50 MCG/ACT nasal spray, Administer 2 sprays  into the nostril(s) as directed by provider Daily., Disp: , Rfl:     FREESTYLE LITE test strip, 1 each by Other route Daily., Disp: , Rfl:     gabapentin (NEURONTIN) 300 MG capsule, Take 1 capsule by mouth Daily As Needed (at bedtime) for up to 7 days., Disp: 7 capsule, Rfl: 0    Lancets (freestyle) lancets, INJECT 1 EACH INTO THE SKIN DAILY., Disp: , Rfl:     linaclotide (LINZESS) 290 MCG capsule capsule, Take 1 capsule by mouth Every Morning Before Breakfast., Disp: , Rfl:     metFORMIN ER (GLUCOPHAGE-XR) 500 MG 24 hr tablet, Take 1 tablet by mouth Daily With Breakfast., Disp: , Rfl:     montelukast (SINGULAIR) 10 MG tablet, Take 1 tablet by mouth Every Night., Disp: , Rfl:     potassium chloride 10 MEQ CR tablet, Take 2 tablets by mouth Daily., Disp: , Rfl:     SUMAtriptan (IMITREX) 100 MG tablet, Take 1 tablet by mouth Every 2 (Two) Hours As Needed for Migraine. Take one tablet at onset of headache. May repeat dose one time in 2 hours if headache not relieved., Disp: , Rfl:     [Paused] furosemide (LASIX) 20 MG tablet, Take 1 tablet by mouth Daily. (Patient not taking: Reported on 6/2/2025), Disp: , Rfl:     rosuvastatin (CRESTOR) 10 MG tablet, Take 1 tablet by mouth every night at bedtime. (Patient not taking: Reported on 6/2/2025), Disp: , Rfl:     [Paused] valsartan (DIOVAN) 160 MG tablet, Take 1 tablet by mouth Daily. (Patient not taking: Reported on 6/2/2025), Disp: , Rfl:     Allergies   Allergen Reactions    Pollen Extract Hives, Itching, Rash, Shortness Of Breath and Swelling    Tolu Grass Pollen Allergen Itching, Rash and Shortness Of Breath    Hydromorphone Hives     Pruritic rash    Flavoring Agent Hives    Hyoscyamine Itching    Hyoscyamine Sulfate Itching and Nausea And Vomiting    Oxycodone Itching     Itching rash    Adhesive Tape Rash     Peels skin    Beef Allergy Hives, Nausea Only, Rash and Swelling    Celery Hives, Itching, Rash and Swelling    Chicken Allergy Itching, Nausea Only, Rash  and Photosensitivity    Codeine Itching, Nausea Only and Rash    Dust Mite Extract Hives, Itching, Rash and Swelling    Jacquie Hives, Itching, Rash and Swelling    Hydrocodone Itching and Rash    Morphine Hives, Itching, Nausea Only, Rash and Swelling    Nuts Hives, Itching, Rash and Swelling    Other Hives, Itching, Nausea Only, Rash and Swelling     Potatoes, greens beans, and tuna fish, celery    Other Food Allergy Hives, Itching, Rash and Swelling     HAS FOOD ALLERGIES    Tuna Flavoring Agent (Non-Screening) Hives, Itching, Rash and Swelling    Turkey Hives, Itching, Rash and Swelling    Vancomycin Hives, Itching, Nausea And Vomiting, Rash and Swelling    Vanilla Hives, Rash and Swelling       Family History   Problem Relation Age of Onset    Heart disease Mother     Osteoporosis Mother     Anemia Mother     Asthma Mother     Heart attack Mother     Heart failure Mother     Hypertension Mother     Diabetes Maternal Grandmother     Cancer Brother     Diabetes Maternal Aunt     Hypertension Father        Past Surgical History:   Procedure Laterality Date    CHOLECYSTECTOMY      EYE SURGERY      HEMORRHOIDECTOMY      HYSTERECTOMY      TOENAIL EXCISION  July 13, 2023       Past Medical History:   Diagnosis Date    Asthma     Callus     Colitis     COPD (chronic obstructive pulmonary disease) 2023    Chronic bronchitis    Diabetes mellitus July 2023    Difficulty walking 8/2023    Fibromyalgia     Hyperlipidemia 3/2025    Hypertension 2/2023    Ingrown toenail July 2023    Interstitial cystitis     Irritable bowel syndrome     Migraine     Neuropathy in diabetes 2023    Paronychia 8/2023    Mrsa and enterobacter cloacae       Family History   Problem Relation Age of Onset    Heart disease Mother     Osteoporosis Mother     Anemia Mother     Asthma Mother     Heart attack Mother     Heart failure Mother     Hypertension Mother     Diabetes Maternal Grandmother     Cancer Brother     Diabetes Maternal Aunt      "Hypertension Father        Social History     Socioeconomic History    Marital status:    Tobacco Use    Smoking status: Never     Passive exposure: Never    Smokeless tobacco: Never   Vaping Use    Vaping status: Never Used   Substance and Sexual Activity    Alcohol use: No    Drug use: No    Sexual activity: Yes     Partners: Male     Birth control/protection: Post-menopausal, Tubal ligation           ECG 12 Lead    Date/Time: 6/2/2025 1:19 PM  Performed by: Sayra Brennan MD    Authorized by: Sayra Brennan MD  Previous ECG: no previous ECG available  Comments: Normal sinus rhythm nonspecific ST-T wave changes 69/min normal axis normal intervals no ectopy prior tracing for comparison.          Objective:       Physical Exam    /86 (BP Location: Left arm, Patient Position: Sitting, Cuff Size: Adult)   Pulse 75   Ht 162.6 cm (64\")   Wt 79.8 kg (176 lb)   SpO2 94%   BMI 30.21 kg/m²   The patient is alert, oriented and in no distress.    Vital signs as noted above.    Head and neck revealed no carotid bruits or jugular venous distension.  No thyromegaly or lymphadenopathy is present.    Lungs clear.  No wheezing.  Breath sounds are normal bilaterally.    Heart normal first and second heart sounds.  No murmur..  No pericardial rub is present.  No gallop is present.    Abdomen soft and nontender.  No organomegaly is present.    Extremities revealed good peripheral pulses without any pedal edema.    Skin warm and dry.    Musculoskeletal system is grossly normal.    CNS grossly normal.    Reviewed and updated.        "

## 2025-06-03 ENCOUNTER — PATIENT ROUNDING (BHMG ONLY) (OUTPATIENT)
Dept: CARDIOLOGY | Facility: CLINIC | Age: 55
End: 2025-06-03
Payer: MEDICARE

## 2025-06-03 NOTE — PROGRESS NOTES
A My-Chart message has been sent to the patient for PATIENT ROUNDING with [facility] [place]

## 2025-07-07 ENCOUNTER — HOSPITAL ENCOUNTER (OUTPATIENT)
Dept: CARDIOLOGY | Facility: HOSPITAL | Age: 55
Discharge: HOME | End: 2025-07-07
Payer: MEDICARE

## 2025-07-07 VITALS
WEIGHT: 176 LBS | SYSTOLIC BLOOD PRESSURE: 153 MMHG | BODY MASS INDEX: 30.05 KG/M2 | DIASTOLIC BLOOD PRESSURE: 90 MMHG | HEIGHT: 64 IN | HEART RATE: 67 BPM

## 2025-07-07 DIAGNOSIS — I25.10 CORONARY ARTERY CALCIFICATION SEEN ON CT SCAN: ICD-10-CM

## 2025-07-07 DIAGNOSIS — R06.02 SHORTNESS OF BREATH: ICD-10-CM

## 2025-07-07 DIAGNOSIS — E78.5 DYSLIPIDEMIA: ICD-10-CM

## 2025-07-07 DIAGNOSIS — I10 ESSENTIAL HYPERTENSION: ICD-10-CM

## 2025-07-07 LAB
AORTIC DIMENSIONLESS INDEX: 0.8 (DI)
AV MEAN PRESS GRAD SYS DOP V1V2: 3.2 MMHG
AV VMAX SYS DOP: 124.2 CM/SEC
BH CV ECHO MEAS - AO MAX PG: 6.2 MMHG
BH CV ECHO MEAS - AO ROOT DIAM: 2.9 CM
BH CV ECHO MEAS - AO V2 VTI: 26.1 CM
BH CV ECHO MEAS - AVA(I,D): 2.22 CM2
BH CV ECHO MEAS - EDV(CUBED): 40.2 ML
BH CV ECHO MEAS - EDV(MOD-SP4): 49 ML
BH CV ECHO MEAS - EF(MOD-SP4): 73.5 %
BH CV ECHO MEAS - ESV(CUBED): 16 ML
BH CV ECHO MEAS - ESV(MOD-SP4): 13 ML
BH CV ECHO MEAS - FS: 26.4 %
BH CV ECHO MEAS - IVS/LVPW: 1.03 CM
BH CV ECHO MEAS - IVSD: 1 CM
BH CV ECHO MEAS - LA DIMENSION: 3.4 CM
BH CV ECHO MEAS - LAT PEAK E' VEL: 11.7 CM/SEC
BH CV ECHO MEAS - LV MASS(C)D: 97.4 GRAMS
BH CV ECHO MEAS - LV MAX PG: 4.4 MMHG
BH CV ECHO MEAS - LV MEAN PG: 2.12 MMHG
BH CV ECHO MEAS - LV V1 MAX: 105.4 CM/SEC
BH CV ECHO MEAS - LV V1 VTI: 20.7 CM
BH CV ECHO MEAS - LVIDD: 3.4 CM
BH CV ECHO MEAS - LVIDS: 2.5 CM
BH CV ECHO MEAS - LVOT AREA: 2.8 CM2
BH CV ECHO MEAS - LVOT DIAM: 1.88 CM
BH CV ECHO MEAS - LVPWD: 0.97 CM
BH CV ECHO MEAS - MED PEAK E' VEL: 7.9 CM/SEC
BH CV ECHO MEAS - MV A MAX VEL: 97.8 CM/SEC
BH CV ECHO MEAS - MV DEC SLOPE: 246 CM/SEC2
BH CV ECHO MEAS - MV DEC TIME: 0.28 SEC
BH CV ECHO MEAS - MV E MAX VEL: 69.1 CM/SEC
BH CV ECHO MEAS - MV E/A: 0.71
BH CV ECHO MEAS - MV MAX PG: 3.6 MMHG
BH CV ECHO MEAS - MV MEAN PG: 1.39 MMHG
BH CV ECHO MEAS - MV V2 VTI: 29.1 CM
BH CV ECHO MEAS - MVA(VTI): 1.98 CM2
BH CV ECHO MEAS - PA V2 MAX: 81 CM/SEC
BH CV ECHO MEAS - RAP SYSTOLE: 3 MMHG
BH CV ECHO MEAS - RV MAX PG: 0.49 MMHG
BH CV ECHO MEAS - RV V1 MAX: 35.1 CM/SEC
BH CV ECHO MEAS - RV V1 VTI: 7.6 CM
BH CV ECHO MEAS - RVSP: 19.2 MMHG
BH CV ECHO MEAS - SV(LVOT): 57.8 ML
BH CV ECHO MEAS - SV(MOD-SP4): 36.1 ML
BH CV ECHO MEAS - TAPSE (>1.6): 1.96 CM
BH CV ECHO MEAS - TR MAX PG: 16.2 MMHG
BH CV ECHO MEAS - TR MAX VEL: 201.3 CM/SEC
BH CV ECHO MEASUREMENTS AVERAGE E/E' RATIO: 7.05
BH CV REST NUCLEAR ISOTOPE DOSE: 11 MCI
BH CV STRESS COMMENTS STAGE 1: NORMAL
BH CV STRESS DOSE REGADENOSON STAGE 1: 0.4
BH CV STRESS DURATION MIN STAGE 1: 0
BH CV STRESS DURATION SEC STAGE 1: 10
BH CV STRESS NUCLEAR ISOTOPE DOSE: 33.7 MCI
BH CV STRESS PROTOCOL 1: NORMAL
BH CV STRESS RECOVERY BP: NORMAL MMHG
BH CV STRESS RECOVERY HR: 88 BPM
BH CV STRESS STAGE 1: 1
LV EF BIPLANE MOD: 70 %
MAXIMAL PREDICTED HEART RATE: 164 BPM
SPECT HRT GATED+EF W RNC IV: 70 %
STRESS BASELINE BP: NORMAL MMHG
STRESS BASELINE HR: 66 BPM
STRESS TARGET HR: 139 BPM

## 2025-07-07 PROCEDURE — 93306 TTE W/DOPPLER COMPLETE: CPT | Performed by: INTERNAL MEDICINE

## 2025-07-07 PROCEDURE — 34310000005 TECHNETIUM SESTAMIBI: Performed by: INTERNAL MEDICINE

## 2025-07-07 PROCEDURE — A9500 TC99M SESTAMIBI: HCPCS | Performed by: INTERNAL MEDICINE

## 2025-07-07 PROCEDURE — 93306 TTE W/DOPPLER COMPLETE: CPT

## 2025-07-07 PROCEDURE — 93017 CV STRESS TEST TRACING ONLY: CPT

## 2025-07-07 PROCEDURE — 78452 HT MUSCLE IMAGE SPECT MULT: CPT | Performed by: INTERNAL MEDICINE

## 2025-07-07 PROCEDURE — 93016 CV STRESS TEST SUPVJ ONLY: CPT | Performed by: NURSE PRACTITIONER

## 2025-07-07 PROCEDURE — 93018 CV STRESS TEST I&R ONLY: CPT | Performed by: INTERNAL MEDICINE

## 2025-07-07 PROCEDURE — 78452 HT MUSCLE IMAGE SPECT MULT: CPT

## 2025-07-07 PROCEDURE — 25010000002 REGADENOSON 0.4 MG/5ML SOLUTION: Performed by: INTERNAL MEDICINE

## 2025-07-07 RX ORDER — REGADENOSON 0.08 MG/ML
0.4 INJECTION, SOLUTION INTRAVENOUS
Status: COMPLETED | OUTPATIENT
Start: 2025-07-07 | End: 2025-07-07

## 2025-07-07 RX ADMIN — REGADENOSON 0.4 MG: 0.08 INJECTION, SOLUTION INTRAVENOUS at 09:56

## 2025-07-07 RX ADMIN — TECHNETIUM TC 99M SESTAMIBI 1 DOSE: 1 INJECTION INTRAVENOUS at 09:56

## 2025-07-07 RX ADMIN — TECHNETIUM TC 99M SESTAMIBI 1 DOSE: 1 INJECTION INTRAVENOUS at 08:23

## 2025-07-10 ENCOUNTER — OFFICE VISIT (OUTPATIENT)
Age: 55
End: 2025-07-10
Payer: MEDICARE

## 2025-07-10 VITALS — HEIGHT: 64 IN | BODY MASS INDEX: 30.05 KG/M2 | WEIGHT: 176 LBS | HEART RATE: 69 BPM | OXYGEN SATURATION: 97 %

## 2025-07-10 DIAGNOSIS — M77.31 HEEL SPUR, RIGHT: ICD-10-CM

## 2025-07-10 DIAGNOSIS — E11.42 DIABETIC PERIPHERAL NEUROPATHY ASSOCIATED WITH TYPE 2 DIABETES MELLITUS: Primary | ICD-10-CM

## 2025-07-10 DIAGNOSIS — L60.0 INGROWN TOENAIL: ICD-10-CM

## 2025-07-10 DIAGNOSIS — M79.674 GREAT TOE PAIN, RIGHT: ICD-10-CM

## 2025-07-10 NOTE — PROGRESS NOTES
07/10/2025  Foot and Ankle Surgery - Established Patient/Follow-up  Provider: TEQUILA Hoffman   Location: [facility]    Subjective:  Promise Miles is a 55 y.o. female.     Chief Complaint   Patient presents with    Right Foot - Follow-up, Nail Problem    Left Foot - Ingrown Toenail       History of Present Illness  The patient is a 55-year-old female who presents for follow-up of ingrown toenails, accompanied by a .    She reports that her feet are generally in good condition, but she has noticed that her toenails are growing into the corners. This causes discomfort when wearing shoes, particularly tennis shoes, due to irritation. She has attempted to remove the ingrown nails herself, but they tend to regrow. The process of removing them is painful, so she has decided to leave them alone for now. However, even without intervention, the irritation persists. She expresses a desire to have the issue resolved permanently. She has been using Flex shoes, which do not cause friction or compress her feet.    Her rheumatologist recently conducted x-rays of her hands and feet, revealing a significant issue with her Achilles tendon. She experiences pain in this area, but it does not impede her ability to walk. She also experiences neuropathy, which occasionally causes swelling. She has not observed any redness in the affected area. She has arthritis.    Her blood sugar levels have been slightly elevated, around 145, but she has been under a lot of stress recently. She had an abnormal EKG and her blood sugar levels have been fluctuating over the past few weeks. One morning, her blood sugar was 87, which she considered good.    FAMILY HISTORY  - Mother: Toenail issues, had big toenails removed at age 30 or 35       Allergies   Allergen Reactions    Pollen Extract Hives, Itching, Rash, Shortness Of Breath and Swelling    Tolu Grass Pollen Allergen Itching, Rash and Shortness Of Breath    Hydromorphone  Hives     Pruritic rash    Flavoring Agent Hives    Hyoscyamine Itching    Hyoscyamine Sulfate Itching and Nausea And Vomiting    Oxycodone Itching     Itching rash    Adhesive Tape Rash     Peels skin    Beef Allergy Hives, Nausea Only, Rash and Swelling    Celery Hives, Itching, Rash and Swelling    Chicken Allergy Itching, Nausea Only, Rash and Photosensitivity    Codeine Itching, Nausea Only and Rash    Dust Mite Extract Hives, Itching, Rash and Swelling    Ginger Hives, Itching, Rash and Swelling    Hydrocodone Itching and Rash    Morphine Hives, Itching, Nausea Only, Rash and Swelling    Nuts Hives, Itching, Rash and Swelling    Other Hives, Itching, Nausea Only, Rash and Swelling     Potatoes, greens beans, and tuna fish, celery    Other Food Allergy Hives, Itching, Rash and Swelling     HAS FOOD ALLERGIES    Tuna Flavoring Agent (Non-Screening) Hives, Itching, Rash and Swelling    Turkey Hives, Itching, Rash and Swelling    Vancomycin Hives, Itching, Nausea And Vomiting, Rash and Swelling    Vanilla Hives, Rash and Swelling       Current Outpatient Medications on File Prior to Visit   Medication Sig Dispense Refill    amphetamine-dextroamphetamine (ADDERALL) 10 MG tablet Take 1 tablet by mouth Daily.      azelastine (ASTELIN) 0.1 % nasal spray Administer 2 sprays into the nostril(s) as directed by provider 2 (Two) Times a Day. Use in each nostril as directed      cetirizine (zyrTEC) 10 MG tablet Take 1 tablet by mouth Daily.      DULoxetine (CYMBALTA) 30 MG capsule Take 3 capsules by mouth Daily.      EPINEPHrine (EPIPEN) 0.3 MG/0.3ML solution auto-injector injection       fluticasone (FLONASE) 50 MCG/ACT nasal spray Administer 2 sprays into the nostril(s) as directed by provider Daily.      FREESTYLE LITE test strip 1 each by Other route Daily.      [Paused] furosemide (LASIX) 20 MG tablet Take 1 tablet by mouth Daily.      gabapentin (NEURONTIN) 300 MG capsule Take 1 capsule by mouth Daily As Needed (at  "bedtime) for up to 7 days. 7 capsule 0    Lancets (freestyle) lancets INJECT 1 EACH INTO THE SKIN DAILY.      linaclotide (LINZESS) 290 MCG capsule capsule Take 1 capsule by mouth Every Morning Before Breakfast.      metFORMIN ER (GLUCOPHAGE-XR) 500 MG 24 hr tablet Take 1 tablet by mouth Daily With Breakfast.      montelukast (SINGULAIR) 10 MG tablet Take 1 tablet by mouth Every Night.      potassium chloride 10 MEQ CR tablet Take 2 tablets by mouth Daily.      rosuvastatin (CRESTOR) 10 MG tablet Take 1 tablet by mouth every night at bedtime.      SUMAtriptan (IMITREX) 100 MG tablet Take 1 tablet by mouth Every 2 (Two) Hours As Needed for Migraine. Take one tablet at onset of headache. May repeat dose one time in 2 hours if headache not relieved.      [Paused] valsartan (DIOVAN) 160 MG tablet Take 1 tablet by mouth Daily.       No current facility-administered medications on file prior to visit.       Objective   Pulse 69   Ht 162.6 cm (64\")   Wt 79.8 kg (176 lb)   SpO2 97%   BMI 30.21 kg/m²     Foot/Ankle Exam  Physical Exam  Musculoskeletal:  Feet: Toenails are growing into the corners, causing irritation when wearing shoes. Mild bunion noted.  Right heel: Heel spur and pointy prominence on heel bone noted.       Results  Labs   - Blood Glucose Test: Blood sugar around 145, one time it was 87    Imaging   - X-ray of right foot: Heel spur and a pointy prominence on the heel bone     Assessment & Plan   Diagnoses and all orders for this visit:    1. Diabetic peripheral neuropathy associated with type 2 diabetes mellitus (Primary)    2. Great toe pain, right    3. Ingrown toenail    4. Heel spur, right      Assessment & Plan  1. Ingrown toenails:  She reports pain and irritation from ingrown toenails, particularly when wearing shoes. The option of re-exploring the nail matrix and applying acid was discussed. She opted for the procedure to be done today on one toe. The procedure involves numbing the toe, removing " the nail matrix, and applying acid. Post-procedure care instructions were provided. It can take 6 to 8 weeks for complete healing.    2. Achilles tendon pain:  She reports occasional pain in the Achilles tendon, which does not hinder her walking. An x-ray from over a year ago shows a heel spur and a pointy prominence on the heel bone, which could predispose her to Achilles tendon pain. Conservative treatment with shoes that do not rub against the back of the heel was recommended. Calf stretching exercises were provided to help alleviate symptoms. No medication or surgery is needed at this time.    3. Diabetes mellitus:  She reports fluctuating blood sugar levels, with recent readings around 145 mg/dL and one instance of 87 mg/dL. She attributes the fluctuations to recent stress and an abnormal EKG. Continued monitoring of blood sugar levels is advised.    Total Nail Avulsion with Chemical Matrixectomy: right hallux    Consent and time out was performed before proceeding with the procedure.  righthallux was cleansed with alcohol and the digit was blocked in a ring block fashion utilizing 10 mL of 1% lidocaine plain.  A digital tourniquet was applied to the digit.  The nail was lifted from the nail bed and nail margin with a [place].  The offending nail margins were grasped with a hemostat and removed. The nail borders were explored with a curette to ensure adequate removal.  Matrixectomy was performed utilizing three 30 second applications of 89% phenol on a micro-tip applicator.  The area was then rinsed with alcohol to dilute the phenol. The nail fold and exposed nail bed were flushed with normal saline.  Antibiotic ointment followed by sterile compressive dressings were then applied.  The digital tourniquot was removed.  No excessive bleeding or complications were evident.  The patient tolerated procedure and local anesthetic well.     PROCEDURE  Toenail removal with phenol was performed today.       No orders of  the defined types were placed in this encounter.         Patient or patient representative verbalized consent for the use of Ambient Listening during the visit with  TEQUILA Crews for chart documentation. 7/10/2025  16:32 EDT

## 2025-07-11 ENCOUNTER — OFFICE VISIT (OUTPATIENT)
Dept: CARDIOLOGY | Facility: CLINIC | Age: 55
End: 2025-07-11
Payer: MEDICARE

## 2025-07-11 VITALS
HEIGHT: 64 IN | DIASTOLIC BLOOD PRESSURE: 57 MMHG | WEIGHT: 175 LBS | HEART RATE: 62 BPM | OXYGEN SATURATION: 97 % | BODY MASS INDEX: 29.88 KG/M2 | SYSTOLIC BLOOD PRESSURE: 150 MMHG

## 2025-07-11 DIAGNOSIS — Z71.2 ENCOUNTER TO DISCUSS TEST RESULTS: Primary | ICD-10-CM

## 2025-07-11 DIAGNOSIS — E78.5 DYSLIPIDEMIA: ICD-10-CM

## 2025-07-11 DIAGNOSIS — I51.89 DIASTOLIC DYSFUNCTION WITHOUT HEART FAILURE: ICD-10-CM

## 2025-07-11 DIAGNOSIS — I10 ESSENTIAL HYPERTENSION: ICD-10-CM

## 2025-07-11 PROBLEM — E11.9 TYPE 2 DIABETES MELLITUS WITHOUT COMPLICATION, WITHOUT LONG-TERM CURRENT USE OF INSULIN: Status: ACTIVE | Noted: 2025-07-11

## 2025-07-11 RX ORDER — METOPROLOL SUCCINATE 25 MG/1
12.5 TABLET, EXTENDED RELEASE ORAL DAILY
Qty: 15 TABLET | Refills: 11 | Status: SHIPPED | OUTPATIENT
Start: 2025-07-11 | End: 2026-07-06

## 2025-07-11 NOTE — PROGRESS NOTES
Subjective:     Encounter Date:07/11/2025      Patient ID: Promise Miles is a 55 y.o. female.    Chief Complaint: follow up to discuss results     History of Present Illness      Promise Miles is a patient of Dr. Brennan with a PMH of       -Abnormal CT. coronary calcification     - Shortness of breath with activity.     - Diabetes dyslipidemia hypertension     - Status post cholecystectomy hemorrhoidectomy hysterectomy.     - Family history of heart disease heart failure in mother     - Non-smoker     - Multiple allergies (23)-please see the list.  Iodine or contrast was not listed.        Here to discuss stress test and echocardiogram results.  Patient denies any chest pain, however results shortness of breath and heaviness in the chest when laying down at night.  Patient states during the day she is usually fine, but when walking upstairs (about 14 steps) to go lay down for bed she feels an odd sensation in her chest, like a heaviness and associated dizziness.  Occasional edema.   She has stopped statin therapy about 3 months ago due to myalgia.       Blood pressure in office today is 150/57  HR 62 oxygen 97% on room air weight 175 lbs       Cardiac work up:     Echocardiogram 7/7/2025-Dr. Giron    Left ventricular systolic function is normal. Calculated left ventricular EF = 70% Left ventricular ejection fraction appears to be 66 - 70%.    Left ventricular diastolic function is consistent with (grade I) impaired relaxation.    The left atrial cavity is mild to moderately dilated.    Estimated right ventricular systolic pressure from tricuspid regurgitation is normal (<35 mmHg).     Stress Cardiolite test 7/7/2025-Dr. Giron  Myocardial perfusion imaging indicates a normal myocardial perfusion study with no evidence of ischemia. Impressions are consistent with a low risk study.    Left ventricular ejection fraction is normal (Calculated EF = 70%).        Lab Review:   Labs from 5/5/2025 CMP unremarkable except glucose  "114 lipase 64 CBC unremarkable      The following portions of the patient's history were reviewed and updated as appropriate: allergies, current medications, past family history, past medical history, past social history, past surgical history, and problem list.      Review of Systems   Cardiovascular:  Positive for dyspnea on exertion (Walking up steps) and orthopnea. Negative for chest pain (Heaviness).   Neurological:  Positive for dizziness (When laying down).         Past Medical History:   Diagnosis Date    Abnormal ECG     Asthma     Callus     Colitis     COPD (chronic obstructive pulmonary disease) 2023    Chronic bronchitis    Diabetes mellitus July 2023    Difficulty walking 8/2023    Fibromyalgia     Hyperlipidemia 3/2025    Hypertension 2/2023    Ingrown toenail July 2023    Interstitial cystitis     Irritable bowel syndrome     Migraine     Neuropathy in diabetes 2023    Paronychia 8/2023    Mrsa and enterobacter cloacae     Past Surgical History:   Procedure Laterality Date    CHOLECYSTECTOMY      EYE SURGERY      HEMORRHOIDECTOMY      HYSTERECTOMY      TOENAIL EXCISION  July 13, 2023     /57   Pulse 62   Ht 162.6 cm (64\")   Wt 79.4 kg (175 lb)   SpO2 97%   BMI 30.04 kg/m²   Family History   Problem Relation Age of Onset    Heart disease Mother     Osteoporosis Mother     Anemia Mother     Asthma Mother     Heart attack Mother     Heart failure Mother     Hypertension Mother     Diabetes Maternal Grandmother     Cancer Brother     Diabetes Maternal Aunt     Hypertension Father        Current Outpatient Medications:     amphetamine-dextroamphetamine (ADDERALL) 10 MG tablet, Take 1 tablet by mouth Daily., Disp: , Rfl:     azelastine (ASTELIN) 0.1 % nasal spray, Administer 2 sprays into the nostril(s) as directed by provider 2 (Two) Times a Day. Use in each nostril as directed, Disp: , Rfl:     cetirizine (zyrTEC) 10 MG tablet, Take 1 tablet by mouth Daily., Disp: , Rfl:     DULoxetine " (CYMBALTA) 30 MG capsule, Take 3 capsules by mouth Daily., Disp: , Rfl:     fluticasone (FLONASE) 50 MCG/ACT nasal spray, Administer 2 sprays into the nostril(s) as directed by provider Daily., Disp: , Rfl:     furosemide (LASIX) 20 MG tablet, Take 1 tablet by mouth Daily., Disp: , Rfl:     linaclotide (LINZESS) 290 MCG capsule capsule, Take 1 capsule by mouth Every Morning Before Breakfast., Disp: , Rfl:     metFORMIN ER (GLUCOPHAGE-XR) 500 MG 24 hr tablet, Take 1 tablet by mouth Daily With Breakfast., Disp: , Rfl:     montelukast (SINGULAIR) 10 MG tablet, Take 1 tablet by mouth Every Night., Disp: , Rfl:     potassium chloride 10 MEQ CR tablet, Take 2 tablets by mouth Daily., Disp: , Rfl:     SUMAtriptan (IMITREX) 100 MG tablet, Take 1 tablet by mouth Every 2 (Two) Hours As Needed for Migraine. Take one tablet at onset of headache. May repeat dose one time in 2 hours if headache not relieved., Disp: , Rfl:     valsartan (DIOVAN) 160 MG tablet, Take 1 tablet by mouth Daily., Disp: , Rfl:     empagliflozin (Jardiance) 10 MG tablet tablet, Take 1 tablet by mouth Daily., Disp: 30 tablet, Rfl: 11    EPINEPHrine (EPIPEN) 0.3 MG/0.3ML solution auto-injector injection, , Disp: , Rfl:     FREESTYLE LITE test strip, 1 each by Other route Daily., Disp: , Rfl:     Lancets (freestyle) lancets, INJECT 1 EACH INTO THE SKIN DAILY., Disp: , Rfl:     metoprolol succinate XL (TOPROL-XL) 25 MG 24 hr tablet, Take 0.5 tablets by mouth Daily for 360 days., Disp: 15 tablet, Rfl: 11    rosuvastatin (CRESTOR) 10 MG tablet, Take 1 tablet by mouth every night at bedtime. (Patient not taking: Reported on 7/11/2025), Disp: , Rfl:   Allergies   Allergen Reactions    Pollen Extract Hives, Itching, Rash, Shortness Of Breath and Swelling    Tolu Grass Pollen Allergen Itching, Rash and Shortness Of Breath    Hydromorphone Hives     Pruritic rash    Flavoring Agent Hives    Hyoscyamine Itching    Hyoscyamine Sulfate Itching and Nausea And  Vomiting    Oxycodone Itching     Itching rash    Adhesive Tape Rash     Peels skin    Beef Allergy Hives, Nausea Only, Rash and Swelling    Celery Hives, Itching, Rash and Swelling    Chicken Allergy Itching, Nausea Only, Rash and Photosensitivity    Codeine Itching, Nausea Only and Rash    Dust Mite Extract Hives, Itching, Rash and Swelling    Jacquie Hives, Itching, Rash and Swelling    Hydrocodone Itching and Rash    Morphine Hives, Itching, Nausea Only, Rash and Swelling    Nuts Hives, Itching, Rash and Swelling    Other Hives, Itching, Nausea Only, Rash and Swelling     Potatoes, greens beans, and tuna fish, celery    Other Food Allergy Hives, Itching, Rash and Swelling     HAS FOOD ALLERGIES    Tuna Flavoring Agent (Non-Screening) Hives, Itching, Rash and Swelling    Turkey Hives, Itching, Rash and Swelling    Vancomycin Hives, Itching, Nausea And Vomiting, Rash and Swelling    Vanilla Hives, Rash and Swelling     Social History     Socioeconomic History    Marital status:    Tobacco Use    Smoking status: Never     Passive exposure: Never    Smokeless tobacco: Never   Vaping Use    Vaping status: Never Used   Substance and Sexual Activity    Alcohol use: No    Drug use: No    Sexual activity: Yes     Partners: Male     Birth control/protection: Post-menopausal, Tubal ligation                Objective:     Vitals reviewed.   Constitutional:       Appearance: Healthy appearance. Not in distress.   Neck:      Vascular: No JVR. JVD normal.   Pulmonary:      Effort: Pulmonary effort is normal.      Breath sounds: Normal breath sounds. No wheezing. No rhonchi. No rales.   Chest:      Chest wall: Not tender to palpatation.   Cardiovascular:      PMI at left midclavicular line. Normal rate. Regular rhythm. Normal S1. Normal S2.       Murmurs: There is no murmur.      No gallop.  No click. No rub.   Pulses:     Intact distal pulses.   Edema:     Peripheral edema absent.   Abdominal:      General: Bowel sounds  are normal.      Palpations: Abdomen is soft.      Tenderness: There is no abdominal tenderness.   Musculoskeletal: Normal range of motion.         General: No tenderness. Skin:     General: Skin is warm and dry.   Neurological:      General: No focal deficit present.      Mental Status: Alert and oriented to person, place and time.       Procedures                  Assessment:     [facility]       Diagnosis Plan   1. Encounter to discuss test results        2. Essential hypertension        3. Diastolic dysfunction without heart failure        4. Dyslipidemia                       Plan:   Chart reviewed  Labs reviewed  Discussed stress test with patient is low risk and unremarkable  Discussed echocardiogram with patient showing diastolic dysfunction otherwise no abnormality    Will continue GDMT for diastolic dysfunction with valsartan 160 mg daily  Added metoprolol 12.5 mg daily and Jardiance 10 mg daily  Discussed with patient to monitor weight and blood pressure as well as heart rate  If decreasing in weight and no edema can discontinue Lasix and potassium    Monitor blood sugars on Jardiance and discussed symptoms such as UTIs and yeast infections      Follow-up with Dr. Brennan in 3 to 6 months    Electronically signed by TEQUILA Rice, 07/11/25, 11:58 AM EDT.          This document is intended for medical expert use only.  Reading of this document by patients and/or patient's family without participating medical staff guidance may result in misinterpretation and unintended morbidity. Any interpretation of such data is the responsibility of the patient and/or family member responsible for the patient in concert with their primary or specialist providers, not to be left for sources of online search as such as [employer], [employer] or similar queries.  Relying on these approaches to knowledge may result in misinterpretation, misguided goals of care and even death should patient or family members try recommendations outside  of the realm of professional medical care in a supervised inpatient environment.

## 2025-07-25 ENCOUNTER — TELEPHONE (OUTPATIENT)
Dept: CARDIOLOGY | Facility: CLINIC | Age: 55
End: 2025-07-25
Payer: MEDICARE

## 2025-07-25 NOTE — TELEPHONE ENCOUNTER
"Spoke with patient - BP running ~ 112/60 at the lowest point. Hr staying high 90s and today it spiked to 115 for about 20-30 minutes at rest, she c/o tightness \"weird feeling\" in chest, dizziness, and heart palpitations.   Verified patient is taking     Metoprolol 25mg tablet 0.5 tablet QD   Valsartan 160 mg 1 tablet daily     Patient states she does not get great reception at home and if we need to leave a  we can.     "

## 2025-07-28 NOTE — TELEPHONE ENCOUNTER
Spoke with patient and advised patient to take an extra half tablet of metoprolol as needed for increased heart rate.   Educated patient on caffeine intake and increased heart rate   Patient understood

## 2025-08-01 ENCOUNTER — HOSPITAL ENCOUNTER (EMERGENCY)
Facility: HOSPITAL | Age: 55
Discharge: HOME | End: 2025-08-01
Attending: EMERGENCY MEDICINE
Payer: MEDICARE

## 2025-08-01 ENCOUNTER — APPOINTMENT (OUTPATIENT)
Dept: CARDIOLOGY | Facility: HOSPITAL | Age: 55
End: 2025-08-01
Payer: MEDICARE

## 2025-08-01 VITALS
RESPIRATION RATE: 17 BRPM | HEIGHT: 64 IN | HEART RATE: 60 BPM | WEIGHT: 174.82 LBS | OXYGEN SATURATION: 98 % | TEMPERATURE: 98.2 F | DIASTOLIC BLOOD PRESSURE: 75 MMHG | SYSTOLIC BLOOD PRESSURE: 118 MMHG | BODY MASS INDEX: 29.85 KG/M2

## 2025-08-01 DIAGNOSIS — M79.662 PAIN IN LEFT LOWER LEG: Primary | ICD-10-CM

## 2025-08-01 LAB
BH CV LOWER VASCULAR LEFT COMMON FEMORAL AUGMENT: NORMAL
BH CV LOWER VASCULAR LEFT COMMON FEMORAL COMPETENT: NORMAL
BH CV LOWER VASCULAR LEFT COMMON FEMORAL COMPRESS: NORMAL
BH CV LOWER VASCULAR LEFT COMMON FEMORAL PHASIC: NORMAL
BH CV LOWER VASCULAR LEFT COMMON FEMORAL SPONT: NORMAL
BH CV LOWER VASCULAR LEFT DISTAL FEMORAL COMPRESS: NORMAL
BH CV LOWER VASCULAR LEFT GASTRONEMIUS COMPRESS: NORMAL
BH CV LOWER VASCULAR LEFT GREATER SAPH AK COMPRESS: NORMAL
BH CV LOWER VASCULAR LEFT GREATER SAPH BK COMPRESS: NORMAL
BH CV LOWER VASCULAR LEFT LESSER SAPH COMPRESS: NORMAL
BH CV LOWER VASCULAR LEFT MID FEMORAL AUGMENT: NORMAL
BH CV LOWER VASCULAR LEFT MID FEMORAL COMPETENT: NORMAL
BH CV LOWER VASCULAR LEFT MID FEMORAL COMPRESS: NORMAL
BH CV LOWER VASCULAR LEFT MID FEMORAL PHASIC: NORMAL
BH CV LOWER VASCULAR LEFT MID FEMORAL SPONT: NORMAL
BH CV LOWER VASCULAR LEFT PERONEAL COMPRESS: NORMAL
BH CV LOWER VASCULAR LEFT POPLITEAL AUGMENT: NORMAL
BH CV LOWER VASCULAR LEFT POPLITEAL COMPETENT: NORMAL
BH CV LOWER VASCULAR LEFT POPLITEAL COMPRESS: NORMAL
BH CV LOWER VASCULAR LEFT POPLITEAL PHASIC: NORMAL
BH CV LOWER VASCULAR LEFT POPLITEAL SPONT: NORMAL
BH CV LOWER VASCULAR LEFT POSTERIOR TIBIAL COMPRESS: NORMAL
BH CV LOWER VASCULAR LEFT PROFUNDA FEMORAL COMPRESS: NORMAL
BH CV LOWER VASCULAR LEFT PROXIMAL FEMORAL COMPRESS: NORMAL
BH CV LOWER VASCULAR LEFT SAPHENOFEMORAL JUNCTION COMPRESS: NORMAL
BH CV LOWER VASCULAR RIGHT COMMON FEMORAL AUGMENT: NORMAL
BH CV LOWER VASCULAR RIGHT COMMON FEMORAL COMPETENT: NORMAL
BH CV LOWER VASCULAR RIGHT COMMON FEMORAL COMPRESS: NORMAL
BH CV LOWER VASCULAR RIGHT COMMON FEMORAL PHASIC: NORMAL
BH CV LOWER VASCULAR RIGHT COMMON FEMORAL SPONT: NORMAL

## 2025-08-01 PROCEDURE — 93971 EXTREMITY STUDY: CPT | Performed by: SURGERY

## 2025-08-01 PROCEDURE — 93971 EXTREMITY STUDY: CPT

## 2025-08-01 PROCEDURE — 99284 EMERGENCY DEPT VISIT MOD MDM: CPT

## 2025-08-01 NOTE — ED PROVIDER NOTES
"Subjective   History of Present Illness  Patient is an adult with a history of diabetes, hypertension, hyperlipidemia, diastolic dysfunction, allergies, sinus issues, and asthma who presents with approximately one week of left-sided calf pain that has become steady and persistent over the past two days. The patient denies any trauma to the area and is not aware of any fevers. She reports associated symptoms of weakness and tingling (\"pins and needles\") in the affected foot. She denies any new chest pain or SOB. The patient contacted her primary care provider but was unable to be seen, prompting her visit to the emergency department. She notes the area of pain has turned a little darker over the last few days but denies warmth or redness. Denies fever.  Review of Systems  See HPI.  Past Medical History:   Diagnosis Date    Abnormal ECG     Asthma     Callus     Colitis     COPD (chronic obstructive pulmonary disease) 2023    Chronic bronchitis    Diabetes mellitus July 2023    Difficulty walking 8/2023    Fibromyalgia     Hyperlipidemia 3/2025    Hypertension 2/2023    Ingrown toenail July 2023    Interstitial cystitis     Irritable bowel syndrome     Migraine     Neuropathy in diabetes 2023    Paronychia 8/2023    Mrsa and enterobacter cloacae       Allergies   Allergen Reactions    Pollen Extract Hives, Itching, Rash, Shortness Of Breath and Swelling    Tolu Grass Pollen Allergen Itching, Rash and Shortness Of Breath    Hydromorphone Hives     Pruritic rash    Flavoring Agent Hives    Hyoscyamine Itching    Hyoscyamine Sulfate Itching and Nausea And Vomiting    Oxycodone Itching     Itching rash    Adhesive Tape Rash     Peels skin    Beef Allergy Hives, Nausea Only, Rash and Swelling    Celery Hives, Itching, Rash and Swelling    Chicken Allergy Itching, Nausea Only, Rash and Photosensitivity    Codeine Itching, Nausea Only and Rash    Dust Mite Extract Hives, Itching, Rash and Swelling    Jacquie Hives, " Itching, Rash and Swelling    Hydrocodone Itching and Rash    Morphine Hives, Itching, Nausea Only, Rash and Swelling    Nuts Hives, Itching, Rash and Swelling    Other Hives, Itching, Nausea Only, Rash and Swelling     Potatoes, greens beans, and tuna fish, celery    Other Food Allergy Hives, Itching, Rash and Swelling     HAS FOOD ALLERGIES    Tuna Flavoring Agent (Non-Screening) Hives, Itching, Rash and Swelling    Turkey Hives, Itching, Rash and Swelling    Vancomycin Hives, Itching, Nausea And Vomiting, Rash and Swelling    Vanilla Hives, Rash and Swelling       Past Surgical History:   Procedure Laterality Date    CHOLECYSTECTOMY      EYE SURGERY      HEMORRHOIDECTOMY      HYSTERECTOMY      TOENAIL EXCISION  July 13, 2023       Family History   Problem Relation Age of Onset    Heart disease Mother     Osteoporosis Mother     Anemia Mother     Asthma Mother     Heart attack Mother     Heart failure Mother     Hypertension Mother     Diabetes Maternal Grandmother     Cancer Brother     Diabetes Maternal Aunt     Hypertension Father        Social History     Socioeconomic History    Marital status:    Tobacco Use    Smoking status: Never     Passive exposure: Never    Smokeless tobacco: Never   Vaping Use    Vaping status: Never Used   Substance and Sexual Activity    Alcohol use: No    Drug use: No    Sexual activity: Yes     Partners: Male     Birth control/protection: Post-menopausal, Tubal ligation           Objective   Physical Exam  No acute distress, area consistent with small bruise over posterior left calf, mild tenderness to palpation over calf, no overlying erythema.  No induration or fluctuance.  Normal strength sensation left foot.  Intact PT and DP pulses left foot.  Cap refill less than 2 seconds distally.  No tachypnea or increased work of breathing.  Regular rate and rhythm.  Procedures           ED Course      /75   Pulse 60   Temp 98.2 °F (36.8 °C) (Oral)   Resp 17   Ht 162.6  "cm (64\")   Wt 79.3 kg (174 lb 13.2 oz)   SpO2 98%   BMI 30.01 kg/m²   Labs Reviewed - No data to display  Medications - No data to display  No orders to display                                                      Medical Decision Making  Problems Addressed:  Pain in left lower leg: acute illness or injury    Ultrasound negative for DVT.  Patient neurovascularly intact.  Reassuring exam.  No evidence of infection.  Neurovascularly intact distally.  Will DC.  Return ER precautions discussed in detail.    Final diagnoses:   Pain in left lower leg       ED Disposition  ED Disposition       ED Disposition   Discharge    Condition   Stable    Comment   --               Anjali Harris, APRN  [address] [facility] [place] [address]  [phone]    In 3 days           Medication List      No changes were made to your prescriptions during this visit.            Marv Snyder MD  08/01/25 [id number]    "

## 2025-08-01 NOTE — ED NOTES
Pt arrived to ED c/o pain behind left knee that has become constant x2 days. Rates pain at a 6/10. States she was sent here by PCP to r/o a blood clot. Reports hx of DM and HTN. Denies any new SOB or CP.

## 2025-08-07 ENCOUNTER — TRANSCRIBE ORDERS (OUTPATIENT)
Dept: ADMINISTRATIVE | Facility: HOSPITAL | Age: 55
End: 2025-08-07
Payer: MEDICARE

## 2025-08-07 DIAGNOSIS — R13.10 DYSPHAGIA, UNSPECIFIED TYPE: ICD-10-CM

## 2025-08-07 DIAGNOSIS — K59.04 CHRONIC IDIOPATHIC CONSTIPATION: Primary | ICD-10-CM

## 2025-08-07 DIAGNOSIS — R14.0 BLOATING: ICD-10-CM

## 2025-08-07 DIAGNOSIS — R15.0 INCOMPLETE DEFECATION: ICD-10-CM

## 2025-08-07 DIAGNOSIS — R10.10 UPPER ABDOMINAL PAIN: ICD-10-CM

## 2025-08-25 ENCOUNTER — HOSPITAL ENCOUNTER (OUTPATIENT)
Dept: NUCLEAR MEDICINE | Facility: HOSPITAL | Age: 55
Discharge: HOME | End: 2025-08-25
Admitting: NURSE PRACTITIONER
Payer: MEDICARE

## 2025-08-25 DIAGNOSIS — K59.04 CHRONIC IDIOPATHIC CONSTIPATION: ICD-10-CM

## 2025-08-25 DIAGNOSIS — R10.10 UPPER ABDOMINAL PAIN: ICD-10-CM

## 2025-08-25 DIAGNOSIS — R14.0 BLOATING: ICD-10-CM

## 2025-08-25 DIAGNOSIS — R13.10 DYSPHAGIA, UNSPECIFIED TYPE: ICD-10-CM

## 2025-08-25 DIAGNOSIS — R15.0 INCOMPLETE DEFECATION: ICD-10-CM

## 2025-08-25 PROCEDURE — A9541 TC99M SULFUR COLLOID: HCPCS | Performed by: NURSE PRACTITIONER

## 2025-08-25 PROCEDURE — 78264 GASTRIC EMPTYING IMG STUDY: CPT

## 2025-08-25 PROCEDURE — 34310000005 TECHNETIUM SULFUR COLLOID: Performed by: NURSE PRACTITIONER

## 2025-08-25 RX ADMIN — TECHNETIUM TC 99M SULFUR COLLOID 1 DOSE: KIT at 06:48
